# Patient Record
Sex: FEMALE | Race: BLACK OR AFRICAN AMERICAN | NOT HISPANIC OR LATINO | URBAN - METROPOLITAN AREA
[De-identification: names, ages, dates, MRNs, and addresses within clinical notes are randomized per-mention and may not be internally consistent; named-entity substitution may affect disease eponyms.]

---

## 2017-01-30 ENCOUNTER — FOLLOW UP ESTABLISHED (OUTPATIENT)
Dept: URBAN - METROPOLITAN AREA CLINIC 51 | Facility: CLINIC | Age: 72
End: 2017-01-30
Payer: MEDICARE

## 2017-01-30 DIAGNOSIS — H53.022 REFRACTIVE AMBLYOPIA, LEFT EYE: ICD-10-CM

## 2017-01-30 PROCEDURE — 92133 CPTRZD OPH DX IMG PST SGM ON: CPT | Performed by: OPHTHALMOLOGY

## 2017-01-30 PROCEDURE — 92012 INTRM OPH EXAM EST PATIENT: CPT | Performed by: OPHTHALMOLOGY

## 2017-01-30 RX ORDER — LATANOPROST 50 UG/ML
0.005 % SOLUTION OPHTHALMIC
Qty: 3 | Refills: 1 | Status: INACTIVE
Start: 2017-01-30 | End: 2018-03-26

## 2017-01-30 RX ORDER — DORZOLAMIDE HYDROCHLORIDE 20 MG/ML
2 % SOLUTION OPHTHALMIC
Qty: 3 | Refills: 1 | Status: INACTIVE
Start: 2017-01-30 | End: 2017-04-06

## 2017-01-30 RX ORDER — BRIMONIDINE TARTRATE 2 MG/ML
0.2 % SOLUTION/ DROPS OPHTHALMIC
Qty: 3 | Refills: 1 | Status: INACTIVE
Start: 2017-01-30 | End: 2017-04-10

## 2017-01-30 ASSESSMENT — INTRAOCULAR PRESSURE
OS: 14
OD: 14

## 2017-07-31 ENCOUNTER — FOLLOW UP ESTABLISHED (OUTPATIENT)
Dept: URBAN - METROPOLITAN AREA CLINIC 51 | Facility: CLINIC | Age: 72
End: 2017-07-31
Payer: MEDICARE

## 2017-07-31 DIAGNOSIS — H04.123 DRY EYE SYNDROME OF BILATERAL LACRIMAL GLANDS: ICD-10-CM

## 2017-07-31 DIAGNOSIS — E11.9 TYPE 2 DIABETES MELLITUS WITHOUT COMPLICATIONS: ICD-10-CM

## 2017-07-31 DIAGNOSIS — H40.1133 PRIMARY OPEN-ANGLE GLAUCOMA, BILATERAL, SEVERE STAGE: Primary | ICD-10-CM

## 2017-07-31 PROCEDURE — 92012 INTRM OPH EXAM EST PATIENT: CPT | Performed by: OPHTHALMOLOGY

## 2017-07-31 PROCEDURE — 92083 EXTENDED VISUAL FIELD XM: CPT | Performed by: OPHTHALMOLOGY

## 2017-07-31 ASSESSMENT — INTRAOCULAR PRESSURE
OS: 14
OD: 15

## 2017-11-20 ENCOUNTER — FOLLOW UP ESTABLISHED (OUTPATIENT)
Dept: URBAN - METROPOLITAN AREA CLINIC 51 | Facility: CLINIC | Age: 72
End: 2017-11-20
Payer: MEDICARE

## 2017-11-20 PROCEDURE — 92012 INTRM OPH EXAM EST PATIENT: CPT | Performed by: OPHTHALMOLOGY

## 2017-11-20 ASSESSMENT — INTRAOCULAR PRESSURE
OS: 18
OD: 16

## 2018-04-02 ENCOUNTER — FOLLOW UP ESTABLISHED (OUTPATIENT)
Dept: URBAN - METROPOLITAN AREA CLINIC 51 | Facility: CLINIC | Age: 73
End: 2018-04-02
Payer: MEDICARE

## 2018-04-02 PROCEDURE — 92012 INTRM OPH EXAM EST PATIENT: CPT | Performed by: OPTOMETRIST

## 2018-04-02 ASSESSMENT — INTRAOCULAR PRESSURE
OS: 18
OD: 22
OD: 18
OD: 17
OS: 22

## 2018-05-04 ENCOUNTER — FOLLOW UP ESTABLISHED (OUTPATIENT)
Dept: URBAN - METROPOLITAN AREA CLINIC 51 | Facility: CLINIC | Age: 73
End: 2018-05-04
Payer: MEDICARE

## 2018-05-04 PROCEDURE — 92133 CPTRZD OPH DX IMG PST SGM ON: CPT | Performed by: OPHTHALMOLOGY

## 2018-05-04 PROCEDURE — 92083 EXTENDED VISUAL FIELD XM: CPT | Performed by: OPHTHALMOLOGY

## 2018-05-04 PROCEDURE — 92014 COMPRE OPH EXAM EST PT 1/>: CPT | Performed by: OPHTHALMOLOGY

## 2018-05-04 PROCEDURE — 92020 GONIOSCOPY: CPT | Performed by: OPHTHALMOLOGY

## 2018-05-04 RX ORDER — PILOCARPINE HYDROCHLORIDE 20 MG/ML
2 % SOLUTION/ DROPS OPHTHALMIC
Qty: 3 | Refills: 1 | Status: INACTIVE
Start: 2018-05-04 | End: 2018-07-27

## 2018-05-04 ASSESSMENT — INTRAOCULAR PRESSURE
OS: 19
OD: 22

## 2018-05-04 ASSESSMENT — VISUAL ACUITY
OD: 20/15
OS: 20/30

## 2018-05-04 ASSESSMENT — KERATOMETRY
OS: 46.50
OD: 45.22

## 2018-06-22 ENCOUNTER — FOLLOW UP ESTABLISHED (OUTPATIENT)
Dept: URBAN - METROPOLITAN AREA CLINIC 51 | Facility: CLINIC | Age: 73
End: 2018-06-22
Payer: MEDICARE

## 2018-06-22 DIAGNOSIS — H40.1134 PRIMARY OPEN-ANGLE GLAUCOMA, BILATERAL, INDETERMINATE STAGE: Primary | ICD-10-CM

## 2018-06-22 DIAGNOSIS — Z79.84 LONG TERM (CURRENT) USE OF ORAL ANTIDIABETIC DRUGS: ICD-10-CM

## 2018-06-22 PROCEDURE — 92083 EXTENDED VISUAL FIELD XM: CPT | Performed by: OPHTHALMOLOGY

## 2018-06-22 PROCEDURE — 92014 COMPRE OPH EXAM EST PT 1/>: CPT | Performed by: OPHTHALMOLOGY

## 2018-06-22 RX ORDER — KETOROLAC TROMETHAMINE 5 MG/ML
0.5 % SOLUTION OPHTHALMIC
Qty: 1 | Refills: 1 | Status: INACTIVE
Start: 2018-06-22 | End: 2018-08-30

## 2018-06-22 ASSESSMENT — INTRAOCULAR PRESSURE
OD: 20
OS: 16

## 2018-07-09 ENCOUNTER — Encounter (OUTPATIENT)
Dept: URBAN - METROPOLITAN AREA CLINIC 44 | Facility: CLINIC | Age: 73
End: 2018-07-09
Payer: MEDICARE

## 2018-07-09 PROCEDURE — 65855 TRABECULOPLASTY LASER SURG: CPT | Performed by: OPHTHALMOLOGY

## 2018-07-09 ASSESSMENT — INTRAOCULAR PRESSURE
OD: 16
OS: 16
OS: 14
OD: 20

## 2018-07-17 ENCOUNTER — Encounter (OUTPATIENT)
Dept: URBAN - METROPOLITAN AREA CLINIC 44 | Facility: CLINIC | Age: 73
End: 2018-07-17
Payer: MEDICARE

## 2018-07-17 PROCEDURE — 65855 TRABECULOPLASTY LASER SURG: CPT | Performed by: OPHTHALMOLOGY

## 2018-07-17 ASSESSMENT — INTRAOCULAR PRESSURE
OD: 16
OS: 22
OS: 17

## 2018-07-27 ENCOUNTER — FOLLOW UP ESTABLISHED (OUTPATIENT)
Dept: URBAN - METROPOLITAN AREA CLINIC 51 | Facility: CLINIC | Age: 73
End: 2018-07-27
Payer: MEDICARE

## 2018-07-27 PROCEDURE — 92012 INTRM OPH EXAM EST PATIENT: CPT | Performed by: OPHTHALMOLOGY

## 2018-07-27 ASSESSMENT — INTRAOCULAR PRESSURE
OS: 17
OD: 20

## 2018-08-30 ENCOUNTER — FOLLOW UP ESTABLISHED (OUTPATIENT)
Dept: URBAN - METROPOLITAN AREA CLINIC 44 | Facility: CLINIC | Age: 73
End: 2018-08-30
Payer: MEDICARE

## 2018-08-30 PROCEDURE — 92012 INTRM OPH EXAM EST PATIENT: CPT | Performed by: OPHTHALMOLOGY

## 2018-08-30 RX ORDER — BRIMONIDINE TARTRATE 2 MG/ML
0.2 % SOLUTION/ DROPS OPHTHALMIC
Qty: 3 | Refills: 1 | Status: INACTIVE
Start: 2018-08-30 | End: 2019-04-29

## 2018-08-30 RX ORDER — LATANOPROST 50 UG/ML
0.005 % SOLUTION OPHTHALMIC
Qty: 3 | Refills: 1 | Status: INACTIVE
Start: 2018-08-30 | End: 2019-06-14

## 2018-08-30 ASSESSMENT — INTRAOCULAR PRESSURE
OD: 20
OS: 15

## 2018-12-28 ENCOUNTER — FOLLOW UP ESTABLISHED (OUTPATIENT)
Dept: URBAN - METROPOLITAN AREA CLINIC 51 | Facility: CLINIC | Age: 73
End: 2018-12-28
Payer: MEDICARE

## 2018-12-28 PROCEDURE — 92083 EXTENDED VISUAL FIELD XM: CPT | Performed by: OPHTHALMOLOGY

## 2018-12-28 PROCEDURE — 92012 INTRM OPH EXAM EST PATIENT: CPT | Performed by: OPHTHALMOLOGY

## 2018-12-28 RX ORDER — DORZOLAMIDE HCL 20 MG/ML
2 % SOLUTION/ DROPS OPHTHALMIC
Qty: 2 | Refills: 5 | Status: INACTIVE
Start: 2018-12-28 | End: 2019-08-09

## 2018-12-28 RX ORDER — NETARSUDIL 0.2 MG/ML
0.02 % SOLUTION/ DROPS OPHTHALMIC; TOPICAL
Qty: 1 | Refills: 5 | Status: INACTIVE
Start: 2018-12-28 | End: 2019-02-22

## 2018-12-28 ASSESSMENT — INTRAOCULAR PRESSURE
OS: 17
OD: 20

## 2019-02-22 ENCOUNTER — FOLLOW UP ESTABLISHED (OUTPATIENT)
Dept: URBAN - METROPOLITAN AREA CLINIC 51 | Facility: CLINIC | Age: 74
End: 2019-02-22
Payer: MEDICARE

## 2019-02-22 PROCEDURE — 92012 INTRM OPH EXAM EST PATIENT: CPT | Performed by: OPHTHALMOLOGY

## 2019-02-22 ASSESSMENT — INTRAOCULAR PRESSURE
OD: 21
OS: 17

## 2019-08-09 ENCOUNTER — FOLLOW UP ESTABLISHED (OUTPATIENT)
Dept: URBAN - METROPOLITAN AREA CLINIC 51 | Facility: CLINIC | Age: 74
End: 2019-08-09
Payer: MEDICARE

## 2019-08-09 PROCEDURE — 92250 FUNDUS PHOTOGRAPHY W/I&R: CPT | Performed by: OPHTHALMOLOGY

## 2019-08-09 PROCEDURE — 92014 COMPRE OPH EXAM EST PT 1/>: CPT | Performed by: OPHTHALMOLOGY

## 2019-08-09 PROCEDURE — 92083 EXTENDED VISUAL FIELD XM: CPT | Performed by: OPHTHALMOLOGY

## 2019-08-09 RX ORDER — LATANOPROST 50 UG/ML
0.005 % SOLUTION OPHTHALMIC
Qty: 1 | Refills: 11 | Status: INACTIVE
Start: 2019-08-09 | End: 2020-01-31

## 2019-08-09 RX ORDER — BRIMONIDINE TARTRATE 2 MG/ML
0.2 % SOLUTION/ DROPS OPHTHALMIC
Qty: 3 | Refills: 3 | Status: INACTIVE
Start: 2019-08-09 | End: 2020-01-31

## 2019-08-09 RX ORDER — DORZOLAMIDE HCL 20 MG/ML
2 % SOLUTION/ DROPS OPHTHALMIC
Qty: 1 | Refills: 11 | Status: INACTIVE
Start: 2019-08-09 | End: 2020-01-31

## 2019-08-09 ASSESSMENT — INTRAOCULAR PRESSURE
OS: 17
OD: 20

## 2020-01-31 ENCOUNTER — FOLLOW UP ESTABLISHED (OUTPATIENT)
Dept: URBAN - METROPOLITAN AREA CLINIC 51 | Facility: CLINIC | Age: 75
End: 2020-01-31
Payer: MEDICARE

## 2020-01-31 PROCEDURE — 92012 INTRM OPH EXAM EST PATIENT: CPT | Performed by: OPHTHALMOLOGY

## 2020-01-31 RX ORDER — DORZOLAMIDE HCL 20 MG/ML
2 % SOLUTION/ DROPS OPHTHALMIC
Qty: 1 | Refills: 5 | Status: INACTIVE
Start: 2020-01-31 | End: 2020-10-09

## 2020-01-31 RX ORDER — LATANOPROST 50 UG/ML
0.005 % SOLUTION OPHTHALMIC
Qty: 1 | Refills: 5 | Status: INACTIVE
Start: 2020-01-31 | End: 2020-10-09

## 2020-01-31 RX ORDER — BRIMONIDINE TARTRATE 2 MG/ML
0.2 % SOLUTION/ DROPS OPHTHALMIC
Qty: 1 | Refills: 5 | Status: INACTIVE
Start: 2020-01-31 | End: 2020-06-24

## 2020-01-31 ASSESSMENT — INTRAOCULAR PRESSURE
OS: 16
OD: 20

## 2020-07-13 ENCOUNTER — OFFICE VISIT (OUTPATIENT)
Dept: PRIMARY CARE CLINIC | Age: 75
End: 2020-07-13
Payer: MEDICARE

## 2020-07-13 VITALS
WEIGHT: 140.2 LBS | DIASTOLIC BLOOD PRESSURE: 58 MMHG | OXYGEN SATURATION: 99 % | TEMPERATURE: 98.1 F | HEART RATE: 91 BPM | HEIGHT: 64 IN | BODY MASS INDEX: 23.93 KG/M2 | SYSTOLIC BLOOD PRESSURE: 134 MMHG

## 2020-07-13 PROBLEM — I10 ESSENTIAL HYPERTENSION: Status: ACTIVE | Noted: 2020-07-13

## 2020-07-13 PROBLEM — E78.5 HYPERLIPIDEMIA: Status: ACTIVE | Noted: 2020-07-13

## 2020-07-13 PROBLEM — N18.30 CHRONIC RENAL INSUFFICIENCY, STAGE III (MODERATE) (HCC): Status: ACTIVE | Noted: 2020-07-13

## 2020-07-13 PROBLEM — E55.9 VITAMIN D DEFICIENCY: Status: ACTIVE | Noted: 2020-07-13

## 2020-07-13 PROCEDURE — 99202 OFFICE O/P NEW SF 15 MIN: CPT | Performed by: FAMILY MEDICINE

## 2020-07-13 RX ORDER — HYDROCHLOROTHIAZIDE 12.5 MG/1
12.5 CAPSULE, GELATIN COATED ORAL DAILY
COMMUNITY
End: 2020-10-29 | Stop reason: ALTCHOICE

## 2020-07-13 RX ORDER — AMLODIPINE BESYLATE 5 MG/1
5 TABLET ORAL DAILY
Qty: 90 TABLET | Refills: 1 | Status: SHIPPED | OUTPATIENT
Start: 2020-07-13 | End: 2020-08-27 | Stop reason: SDUPTHER

## 2020-07-13 RX ORDER — PRAVASTATIN SODIUM 80 MG/1
TABLET ORAL
COMMUNITY
End: 2020-09-02 | Stop reason: SDUPTHER

## 2020-07-13 RX ORDER — LOSARTAN POTASSIUM 100 MG/1
100 TABLET ORAL EVERY OTHER DAY
COMMUNITY
End: 2020-08-27 | Stop reason: SDUPTHER

## 2020-07-13 RX ORDER — FERROUS SULFATE 325(65) MG
325 TABLET ORAL DAILY
COMMUNITY
End: 2021-01-20 | Stop reason: ALTCHOICE

## 2020-07-13 RX ORDER — DORZOLAMIDE HCL 20 MG/ML
1 SOLUTION/ DROPS OPHTHALMIC 3 TIMES DAILY
COMMUNITY

## 2020-07-13 RX ORDER — METOPROLOL SUCCINATE 50 MG/1
50 TABLET, EXTENDED RELEASE ORAL DAILY
Qty: 90 TABLET | Refills: 1 | Status: SHIPPED | OUTPATIENT
Start: 2020-07-13 | End: 2021-02-08

## 2020-07-13 RX ORDER — METOPROLOL SUCCINATE 50 MG/1
50 TABLET, EXTENDED RELEASE ORAL
COMMUNITY
End: 2020-07-13 | Stop reason: SDUPTHER

## 2020-07-13 RX ORDER — BRIMONIDINE TARTRATE 2 MG/ML
1 SOLUTION/ DROPS OPHTHALMIC 3 TIMES DAILY
COMMUNITY
End: 2021-07-26 | Stop reason: SDUPTHER

## 2020-07-13 RX ORDER — AMLODIPINE BESYLATE 5 MG/1
5 TABLET ORAL DAILY
COMMUNITY
End: 2020-07-13 | Stop reason: SDUPTHER

## 2020-07-13 RX ORDER — GLIPIZIDE 2.5 MG/1
2.5 TABLET, EXTENDED RELEASE ORAL DAILY
COMMUNITY
End: 2020-11-03 | Stop reason: SDUPTHER

## 2020-07-13 RX ORDER — LATANOPROST 50 UG/ML
SOLUTION/ DROPS OPHTHALMIC
COMMUNITY

## 2020-07-13 SDOH — HEALTH STABILITY: MENTAL HEALTH: HOW OFTEN DO YOU HAVE A DRINK CONTAINING ALCOHOL?: NEVER

## 2020-07-13 ASSESSMENT — ENCOUNTER SYMPTOMS
NAUSEA: 0
ABDOMINAL PAIN: 0
SHORTNESS OF BREATH: 0
VOMITING: 0
DIARRHEA: 0

## 2020-07-13 ASSESSMENT — PATIENT HEALTH QUESTIONNAIRE - PHQ9
SUM OF ALL RESPONSES TO PHQ QUESTIONS 1-9: 0
1. LITTLE INTEREST OR PLEASURE IN DOING THINGS: 0
SUM OF ALL RESPONSES TO PHQ QUESTIONS 1-9: 0
SUM OF ALL RESPONSES TO PHQ9 QUESTIONS 1 & 2: 0
2. FEELING DOWN, DEPRESSED OR HOPELESS: 0

## 2020-07-13 NOTE — PROGRESS NOTES
daily Yes Historical Provider, MD        Past Medical History:   Diagnosis Date    Asthma     Chronic kidney disease     Glaucoma     Hyperlipidemia     Hypertension     Type 2 diabetes mellitus without complication (HCC)         Social History     Tobacco Use    Smoking status: Never Smoker    Smokeless tobacco: Never Used   Substance Use Topics    Alcohol use: Never     Frequency: Never    Drug use: Never        Past Surgical History:   Procedure Laterality Date    HYSTERECTOMY, TOTAL ABDOMINAL  2007        Allergies   Allergen Reactions    Lisinopril Other (See Comments)     cough        Family History   Problem Relation Age of Onset    High Blood Pressure Mother     Stroke Mother     High Blood Pressure Father     Heart Disease Father     High Blood Pressure Sister     Other Brother         Colon and GI cancer in multiple siblings (has 13 siblings)   Janett Barboza Diabetes Brother     High Blood Pressure Brother         Patient's past medical history, surgical history, family history, medications, and allergies  were all reviewed and updated as appropriate today. Review of Systems   Constitutional: Negative for chills, fever and unexpected weight change. Respiratory: Negative for shortness of breath. Cardiovascular: Negative for chest pain. Gastrointestinal: Negative for abdominal pain, diarrhea, nausea and vomiting. BP (!) 134/58   Pulse 91   Temp 98.1 °F (36.7 °C) (Oral)   Ht 5' 4\" (1.626 m)   Wt 140 lb 3.2 oz (63.6 kg)   SpO2 99% Comment: room air  BMI 24.07 kg/m²      Physical Exam  Vitals signs reviewed. Constitutional:       General: She is not in acute distress. Appearance: Normal appearance. She is well-developed. HENT:      Head: Normocephalic and atraumatic. Eyes:      General: No scleral icterus. Conjunctiva/sclera: Conjunctivae normal.      Pupils: Pupils are equal, round, and reactive to light. Neck:      Musculoskeletal: Neck supple.       Thyroid: No thyromegaly. Cardiovascular:      Rate and Rhythm: Normal rate and regular rhythm. Heart sounds: No murmur. Pulmonary:      Effort: Pulmonary effort is normal. No respiratory distress. Breath sounds: Normal breath sounds. Abdominal:      General: Bowel sounds are normal. There is no distension. Palpations: Abdomen is soft. Tenderness: There is no abdominal tenderness. Musculoskeletal:      Right lower leg: No edema. Left lower leg: No edema. Lymphadenopathy:      Cervical: No cervical adenopathy. Skin:     General: Skin is warm and dry. Capillary Refill: Capillary refill takes less than 2 seconds. Neurological:      General: No focal deficit present. Mental Status: She is alert and oriented to person, place, and time. Mental status is at baseline. Psychiatric:         Mood and Affect: Mood normal.         Assessment:  Encounter Diagnoses   Name Primary?  Essential hypertension Yes    Mild intermittent asthma without complication     Type 2 diabetes mellitus without complication, without long-term current use of insulin ([facility]        Plan:  1. Essential hypertension    - amLODIPine (NORVASC) 5 MG tablet; Take 1 tablet by mouth daily  Dispense: 90 tablet; Refill: 1  - metoprolol succinate (TOPROL XL) 50 MG extended release tablet; Take 1 tablet by mouth daily  Dispense: 90 tablet; Refill: 1  - CBC WITH AUTO DIFFERENTIAL; Future  - COMPREHENSIVE METABOLIC PANEL; Future  - LIPID PANEL; Future    2. Mild intermittent asthma without complication    - albuterol sulfate (PROAIR RESPICLICK) 119 (90 Base) MCG/ACT aerosol powder inhalation; Inhale 2 puffs into the lungs every 6 hours as needed for Wheezing  Dispense: 1 Inhaler; Refill: 5    3. Type 2 diabetes mellitus without complication, without long-term current use of insulin ([facility])    - HEMOGLOBIN A1C; Future  - MICROALBUMIN / CREATININE URINE RATIO; Future    Will check labs and call with results. UTD on colonoscopy. Will request all records from last PCP. Return in about 6 months (around 1/13/2021) for Follow up [facility].           [address], DO

## 2020-07-16 ENCOUNTER — TELEPHONE (OUTPATIENT)
Dept: PRIMARY CARE CLINIC | Age: 75
End: 2020-07-16

## 2020-07-16 NOTE — TELEPHONE ENCOUNTER
----- Message from Caren Omer sent at 7/16/2020  2:45 PM EDT -----  Subject: Message to Provider    QUESTIONS  Information for Provider? Pt is calling because she was prescribed   albuterol sulfate (PROAIR RESPICLICK) 422 (90 Base) MCG/ACT aerosol powder   inhalation    and pt states she has never use this kinds before. She wants to know if   she could get the regular one she's been using. Denise  ---------------------------------------------------------------------------  --------------  Tiffany MEDLEY  What is the best way for the office to contact you? Do not leave any   message   patient will call back for answer  Preferred Call Back Phone Number? [phone]  ---------------------------------------------------------------------------  --------------  SCRIPT ANSWERS  Relationship to Patient?  Self

## 2020-07-22 ENCOUNTER — TELEPHONE (OUTPATIENT)
Dept: PRIMARY CARE CLINIC | Age: 75
End: 2020-07-22

## 2020-07-22 RX ORDER — ALBUTEROL SULFATE 90 UG/1
2 AEROSOL, METERED RESPIRATORY (INHALATION) 4 TIMES DAILY PRN
Qty: 1 INHALER | Refills: 5 | Status: SHIPPED | OUTPATIENT
Start: 2020-07-22 | End: 2021-01-15 | Stop reason: SDUPTHER

## 2020-07-22 NOTE — TELEPHONE ENCOUNTER
Pt calling saying she received from pharm a ProAir Respiclick inhaler and has never used this before. She says she is used to albuteral [facility] inhaler and would like that back.   She says the powder is not working for her    Using [address]

## 2020-08-12 DIAGNOSIS — E11.9 TYPE 2 DIABETES MELLITUS WITHOUT COMPLICATION, WITHOUT LONG-TERM CURRENT USE OF INSULIN (HCC): ICD-10-CM

## 2020-08-12 DIAGNOSIS — I10 ESSENTIAL HYPERTENSION: ICD-10-CM

## 2020-08-12 LAB
A/G RATIO: 1.8 (ref 1.1–2.2)
ALBUMIN SERPL-MCNC: 4.5 G/DL (ref 3.4–5)
ALP BLD-CCNC: 82 U/L (ref 40–129)
ALT SERPL-CCNC: 16 U/L (ref 10–40)
ANION GAP SERPL CALCULATED.3IONS-SCNC: 13 MMOL/L (ref 3–16)
AST SERPL-CCNC: 24 U/L (ref 15–37)
BASOPHILS ABSOLUTE: 0.1 K/UL (ref 0–0.2)
BASOPHILS RELATIVE PERCENT: 0.8 %
BILIRUB SERPL-MCNC: 0.6 MG/DL (ref 0–1)
BUN BLDV-MCNC: 16 MG/DL (ref 7–20)
CALCIUM SERPL-MCNC: 10.2 MG/DL (ref 8.3–10.6)
CHLORIDE BLD-SCNC: 104 MMOL/L (ref 99–110)
CHOLESTEROL, TOTAL: 188 MG/DL (ref 0–199)
CO2: 24 MMOL/L (ref 21–32)
CREAT SERPL-MCNC: 1.6 MG/DL (ref 0.6–1.2)
CREATININE URINE: 244.5 MG/DL (ref 28–259)
EOSINOPHILS ABSOLUTE: 0.4 K/UL (ref 0–0.6)
EOSINOPHILS RELATIVE PERCENT: 3.6 %
GFR AFRICAN AMERICAN: 38
GFR NON-AFRICAN AMERICAN: 32
GLOBULIN: 2.5 G/DL
GLUCOSE BLD-MCNC: 152 MG/DL (ref 70–99)
HCT VFR BLD CALC: 35.9 % (ref 36–48)
HDLC SERPL-MCNC: 74 MG/DL (ref 40–60)
HEMOGLOBIN: 11.2 G/DL (ref 12–16)
LDL CHOLESTEROL CALCULATED: 100 MG/DL
LYMPHOCYTES ABSOLUTE: 3.8 K/UL (ref 1–5.1)
LYMPHOCYTES RELATIVE PERCENT: 33 %
MCH RBC QN AUTO: 26.8 PG (ref 26–34)
MCHC RBC AUTO-ENTMCNC: 31.3 G/DL (ref 31–36)
MCV RBC AUTO: 85.6 FL (ref 80–100)
MICROALBUMIN UR-MCNC: <1.2 MG/DL
MICROALBUMIN/CREAT UR-RTO: NORMAL MG/G (ref 0–30)
MONOCYTES ABSOLUTE: 0.9 K/UL (ref 0–1.3)
MONOCYTES RELATIVE PERCENT: 7.6 %
NEUTROPHILS ABSOLUTE: 6.3 K/UL (ref 1.7–7.7)
NEUTROPHILS RELATIVE PERCENT: 55 %
PDW BLD-RTO: 13.8 % (ref 12.4–15.4)
PLATELET # BLD: 378 K/UL (ref 135–450)
PMV BLD AUTO: 7.8 FL (ref 5–10.5)
POTASSIUM SERPL-SCNC: 4.6 MMOL/L (ref 3.5–5.1)
RBC # BLD: 4.19 M/UL (ref 4–5.2)
SODIUM BLD-SCNC: 141 MMOL/L (ref 136–145)
TOTAL PROTEIN: 7 G/DL (ref 6.4–8.2)
TRIGL SERPL-MCNC: 71 MG/DL (ref 0–150)
VLDLC SERPL CALC-MCNC: 14 MG/DL
WBC # BLD: 11.4 K/UL (ref 4–11)

## 2020-08-13 ENCOUNTER — TELEPHONE (OUTPATIENT)
Dept: PRIMARY CARE CLINIC | Age: 75
End: 2020-08-13

## 2020-08-13 LAB
ESTIMATED AVERAGE GLUCOSE: 159.9 MG/DL
HBA1C MFR BLD: 7.2 %

## 2020-08-13 NOTE — TELEPHONE ENCOUNTER
Notified patient. Labs stable. Will establish with nephrology. Referral to Dr. Adry Berrios and message sent. She verbalized understanding.

## 2020-08-25 ENCOUNTER — HOSPITAL ENCOUNTER (EMERGENCY)
Age: 75
Discharge: HOME | End: 2020-08-25
Attending: EMERGENCY MEDICINE
Payer: MEDICARE

## 2020-08-25 ENCOUNTER — APPOINTMENT (OUTPATIENT)
Dept: GENERAL RADIOLOGY | Age: 75
End: 2020-08-25
Payer: MEDICARE

## 2020-08-25 VITALS
OXYGEN SATURATION: 94 % | BODY MASS INDEX: 23.32 KG/M2 | DIASTOLIC BLOOD PRESSURE: 62 MMHG | TEMPERATURE: 98.7 F | HEIGHT: 65 IN | HEART RATE: 89 BPM | RESPIRATION RATE: 18 BRPM | SYSTOLIC BLOOD PRESSURE: 140 MMHG | WEIGHT: 140 LBS

## 2020-08-25 LAB
ANION GAP SERPL CALCULATED.3IONS-SCNC: 12 MMOL/L (ref 3–16)
BASE EXCESS VENOUS: -1.7 MMOL/L (ref -2–3)
BASOPHILS ABSOLUTE: 0.1 K/UL (ref 0–0.2)
BASOPHILS RELATIVE PERCENT: 1 %
BUN BLDV-MCNC: 20 MG/DL (ref 7–20)
CALCIUM SERPL-MCNC: 9.5 MG/DL (ref 8.3–10.6)
CARBOXYHEMOGLOBIN: 1.7 % (ref 0–1.5)
CHLORIDE BLD-SCNC: 101 MMOL/L (ref 99–110)
CO2: 23 MMOL/L (ref 21–32)
CREAT SERPL-MCNC: 1.6 MG/DL (ref 0.6–1.2)
EKG ATRIAL RATE: 99 BPM
EKG DIAGNOSIS: NORMAL
EKG P AXIS: 82 DEGREES
EKG P-R INTERVAL: 138 MS
EKG Q-T INTERVAL: 348 MS
EKG QRS DURATION: 78 MS
EKG QTC CALCULATION (BAZETT): 446 MS
EKG R AXIS: 12 DEGREES
EKG T AXIS: 72 DEGREES
EKG VENTRICULAR RATE: 99 BPM
EOSINOPHILS ABSOLUTE: 0.1 K/UL (ref 0–0.6)
EOSINOPHILS RELATIVE PERCENT: 1 %
GFR AFRICAN AMERICAN: 38
GFR NON-AFRICAN AMERICAN: 32
GLUCOSE BLD-MCNC: 179 MG/DL (ref 70–99)
HCO3 VENOUS: 23.5 MMOL/L (ref 24–28)
HCT VFR BLD CALC: 32.7 % (ref 36–48)
HEMOGLOBIN, VEN, REDUCED: 23.9 %
HEMOGLOBIN: 10.6 G/DL (ref 12–16)
LYMPHOCYTES ABSOLUTE: 5.6 K/UL (ref 1–5.1)
LYMPHOCYTES RELATIVE PERCENT: 39 %
MCH RBC QN AUTO: 27.3 PG (ref 26–34)
MCHC RBC AUTO-ENTMCNC: 32.5 G/DL (ref 31–36)
MCV RBC AUTO: 84.1 FL (ref 80–100)
METHEMOGLOBIN VENOUS: 0.6 % (ref 0–1.5)
MONOCYTES ABSOLUTE: 1.6 K/UL (ref 0–1.3)
MONOCYTES RELATIVE PERCENT: 11 %
NEUTROPHILS ABSOLUTE: 6.9 K/UL (ref 1.7–7.7)
NEUTROPHILS RELATIVE PERCENT: 48 %
O2 SAT, VEN: 76 %
PCO2, VEN: 44.5 MMHG (ref 41–51)
PDW BLD-RTO: 13.6 % (ref 12.4–15.4)
PH VENOUS: 7.34 (ref 7.35–7.45)
PLATELET # BLD: 349 K/UL (ref 135–450)
PMV BLD AUTO: 7.8 FL (ref 5–10.5)
PO2, VEN: 46 MMHG (ref 25–40)
POTASSIUM SERPL-SCNC: 3.7 MMOL/L (ref 3.5–5.1)
RBC # BLD: 3.89 M/UL (ref 4–5.2)
SODIUM BLD-SCNC: 136 MMOL/L (ref 136–145)
TCO2 CALC VENOUS: 25 MMOL/L
TROPONIN: <0.01 NG/ML
WBC # BLD: 14.3 K/UL (ref 4–11)

## 2020-08-25 PROCEDURE — 71045 X-RAY EXAM CHEST 1 VIEW: CPT

## 2020-08-25 PROCEDURE — 6360000002 HC RX W HCPCS: Performed by: EMERGENCY MEDICINE

## 2020-08-25 PROCEDURE — 99285 EMERGENCY DEPT VISIT HI MDM: CPT

## 2020-08-25 PROCEDURE — U0003 INFECTIOUS AGENT DETECTION BY NUCLEIC ACID (DNA OR RNA); SEVERE ACUTE RESPIRATORY SYNDROME CORONAVIRUS 2 (SARS-COV-2) (CORONAVIRUS DISEASE [COVID-19]), AMPLIFIED PROBE TECHNIQUE, MAKING USE OF HIGH THROUGHPUT TECHNOLOGIES AS DESCRIBED BY CMS-2020-01-R: HCPCS

## 2020-08-25 PROCEDURE — 80048 BASIC METABOLIC PNL TOTAL CA: CPT

## 2020-08-25 PROCEDURE — 6370000000 HC RX 637 (ALT 250 FOR IP): Performed by: EMERGENCY MEDICINE

## 2020-08-25 PROCEDURE — 94640 AIRWAY INHALATION TREATMENT: CPT

## 2020-08-25 PROCEDURE — 85025 COMPLETE CBC W/AUTO DIFF WBC: CPT

## 2020-08-25 PROCEDURE — 93005 ELECTROCARDIOGRAM TRACING: CPT | Performed by: EMERGENCY MEDICINE

## 2020-08-25 PROCEDURE — 36415 COLL VENOUS BLD VENIPUNCTURE: CPT

## 2020-08-25 PROCEDURE — 84484 ASSAY OF TROPONIN QUANT: CPT

## 2020-08-25 PROCEDURE — 82803 BLOOD GASES ANY COMBINATION: CPT

## 2020-08-25 RX ORDER — ALBUTEROL SULFATE 2.5 MG/3ML
2.5 SOLUTION RESPIRATORY (INHALATION)
Status: DISCONTINUED | OUTPATIENT
Start: 2020-08-25 | End: 2020-08-25 | Stop reason: HOSPADM

## 2020-08-25 RX ORDER — PREDNISONE 10 MG/1
TABLET ORAL
Qty: 20 TABLET | Refills: 0 | Status: SHIPPED | OUTPATIENT
Start: 2020-08-25 | End: 2020-09-02 | Stop reason: ALTCHOICE

## 2020-08-25 RX ORDER — IPRATROPIUM BROMIDE AND ALBUTEROL SULFATE 2.5; .5 MG/3ML; MG/3ML
1 SOLUTION RESPIRATORY (INHALATION) ONCE
Status: COMPLETED | OUTPATIENT
Start: 2020-08-25 | End: 2020-08-25

## 2020-08-25 RX ORDER — PREDNISONE 20 MG/1
40 TABLET ORAL ONCE
Status: DISCONTINUED | OUTPATIENT
Start: 2020-08-25 | End: 2020-08-25 | Stop reason: HOSPADM

## 2020-08-25 RX ADMIN — ALBUTEROL SULFATE 2.5 MG: 2.5 SOLUTION RESPIRATORY (INHALATION) at 16:07

## 2020-08-25 RX ADMIN — IPRATROPIUM BROMIDE AND ALBUTEROL SULFATE 1 AMPULE: .5; 3 SOLUTION RESPIRATORY (INHALATION) at 16:07

## 2020-08-25 ASSESSMENT — PAIN DESCRIPTION - LOCATION: LOCATION: CHEST

## 2020-08-25 ASSESSMENT — PAIN DESCRIPTION - FREQUENCY: FREQUENCY: CONTINUOUS

## 2020-08-25 ASSESSMENT — PAIN DESCRIPTION - DESCRIPTORS: DESCRIPTORS: TIGHTNESS

## 2020-08-25 ASSESSMENT — PAIN SCALES - GENERAL: PAINLEVEL_OUTOF10: 8

## 2020-08-25 ASSESSMENT — PAIN DESCRIPTION - PAIN TYPE: TYPE: ACUTE PAIN

## 2020-08-25 NOTE — ED NOTES
Ambulated patient to restroom w/ pulse ox, O2 sats remained above 94% during ambulation.       Lonita Kussmaul, RN  08/25/20 5655

## 2020-08-25 NOTE — ED PROVIDER NOTES
[facility] [address] [facility] ENCOUNTER          ATTENDING PHYSICIAN NOTE       Date of evaluation: 8/25/2020    Chief Complaint     Asthma (asthma exacerbation started around 2 pm while at rest)      History of Present Illness     Dia Tong is a 70 y.o. female who presents to the emergency department with a complaint of difficulty breathing. Patient reports that her symptoms are similar to exacerbations of asthma that she has had in the past.  The patient reports that she awoke this morning and had significant difficulty breathing denies cough or productive cough. Reports no recent fevers or chills or sick contacts. Denies significant chest pain but does have some tightness across her chest associated with her difficulty breathing. Denies exertional component to this. Denies any other abdominal pain no other associated symptoms. Review of Systems     As documented in the HPI, otherwise all other systems were reviewed and were negative. Past Medical, Surgical, Family, and Social History     She has a past medical history of Asthma, Chronic kidney disease, Glaucoma, Hyperlipidemia, Hypertension, and Type 2 diabetes mellitus without complication ([facility] She has a past surgical history that includes Hysterectomy, total abdominal (2007). Her family history includes Diabetes in her brother; Heart Disease in her father; High Blood Pressure in her brother, father, mother, and sister; Other in her brother; Stroke in her mother. She reports that she has never smoked. She has never used smokeless tobacco. She reports that she does not drink alcohol or use drugs.     Medications     Previous Medications    ALBUTEROL SULFATE HFA (VENTOLIN HFA) 108 (90 BASE) MCG/ACT INHALER    Inhale 2 puffs into the lungs 4 times daily as needed for Wheezing    AMLODIPINE (NORVASC) 5 MG TABLET    Take 1 tablet by mouth daily    BRIMONIDINE (ALPHAGAN) 0.2 % OPHTHALMIC SOLUTION    1 drop 3 times daily    DORZOLAMIDE (TRUSOPT) 2 % OPHTHALMIC SOLUTION    Place 1 drop into both eyes 3 times daily    FERROUS SULFATE (IRON 325) 325 (65 FE) MG TABLET    Take 325 mg by mouth daily    GLIPIZIDE (GLUCOTROL XL) 2.5 MG EXTENDED RELEASE TABLET    Take 2.5 mg by mouth daily    HYDROCHLOROTHIAZIDE (MICROZIDE) 12.5 MG CAPSULE    Take 12.5 mg by mouth daily    LATANOPROST (XALATAN) 0.005 % OPHTHALMIC SOLUTION    latanoprost 0.005 % eye drops   INSTILL 1 DROP INTO EACH EYE EVERY DAY AT BEDTIME    LOSARTAN (COZAAR) 100 MG TABLET    Take 100 mg by mouth daily    METOPROLOL SUCCINATE (TOPROL XL) 50 MG EXTENDED RELEASE TABLET    Take 1 tablet by mouth daily    PRAVASTATIN (PRAVACHOL) 80 MG TABLET    pravastatin sodium 80 mg tabs    VITAMIN D 25 MCG (1000 UT) CAPS    Take by mouth       Allergies     She is allergic to lisinopril.     Physical Exam     INITIAL VITALS: BP: (!) 142/71, Temp: 98.7 °F (37.1 °C), Pulse: 100, Resp: 18, SpO2: 97 %   General: 82-year-old female sitting in bed without marketed respiratory distress  HEENT:  head is atraumatic, pupils equal round and reactive to light, sclera are clear, oropharynx is nonerythematous  Neck: supple, no lymphadenopathy  Chest: Very mild tachypnea, no accessory muscle use, diminished breath sounds heard throughout all lung fields with wheezes heard throughout all lung fields  Cardiovascular: Regular, rate, and rhythm, 2+ radial pulses bilaterally, capillary refill 2 seconds  Abdominal: Soft, nontender, nondistended, positive bowel sounds throughout, no rebound or guarding  Skin: Warm, dry well perfused, no rashes  Musculoskeletal: no obvious deformities, no tenderness to palpation diffusely  Neurologic:  alert and oriented x4, speech is clear and intact without dysarthria, gait is intact    Diagnostic Results     EKG   Normal sinus rhythm poor anterior R wave progression T wave flattening in the lateral leads otherwise no significant ST or T wave changes that be indicative of active ischemia normal axis normal intervals    RADIOLOGY:  XR CHEST PORTABLE   Final Result      No acute disease             LABS:   Results for orders placed or performed during the hospital encounter of 08/25/20   CBC Auto Differential   Result Value Ref Range    WBC 14.3 (H) 4.0 - 11.0 K/uL    RBC 3.89 (L) 4.00 - 5.20 M/uL    Hemoglobin 10.6 (L) 12.0 - 16.0 g/dL    Hematocrit 32.7 (L) 36.0 - 48.0 %    MCV 84.1 80.0 - 100.0 fL    MCH 27.3 26.0 - 34.0 pg    MCHC 32.5 31.0 - 36.0 g/dL    RDW 13.6 12.4 - 15.4 %    Platelets 882 208 - 500 K/uL    MPV 7.8 5.0 - 10.5 fL   Basic Metabolic Panel   Result Value Ref Range    Sodium 136 136 - 145 mmol/L    Potassium 3.7 3.5 - 5.1 mmol/L    Chloride 101 99 - 110 mmol/L    CO2 23 21 - 32 mmol/L    Anion Gap 12 3 - 16    Glucose 179 (H) 70 - 99 mg/dL    BUN 20 7 - 20 mg/dL    CREATININE 1.6 (H) 0.6 - 1.2 mg/dL    GFR Non- 32 (A) >60    GFR  38 (A) >60    Calcium 9.5 8.3 - 10.6 mg/dL   Blood Gas, Venous   Result Value Ref Range    pH, Rinku 7.343 (L) 7.350 - 7.450    pCO2, Rinku 44.5 41.0 - 51.0 mmHg    pO2, Rinku 46.0 (H) 25.0 - 40.0 mmHg    HCO3, Venous 23.5 (L) 24.0 - 28.0 mmol/L    Base Excess, Rinku -1.7 -2.0 - 3.0 mmol/L    O2 Sat, Rinku 76 Not established %    Carboxyhemoglobin 1.7 (H) 0.0 - 1.5 %    MetHgb, Rinku 0.6 0.0 - 1.5 %    TC02 (Calc), Rinku 25 mmol/L    Hemoglobin, Rinku, Reduced 23.90 %   Troponin   Result Value Ref Range    Troponin <0.01 <0.01 ng/mL         RECENT VITALS:  BP: (!) 140/62, Temp: 98.7 °F (37.1 °C), Pulse: 100, Resp: 18, SpO2: 94 %       ED Course     Nursing Notes, Past Medical Hx, Past Surgical Hx, Social Hx, Allergies, and Family Hx were reviewed.     The patient was given the following medications:  Orders Placed This Encounter   Medications    predniSONE (DELTASONE) tablet 40 mg    ipratropium-albuterol (DUONEB) nebulizer solution 1 ampule    albuterol (PROVENTIL) nebulizer solution 2.5 mg    predniSONE (DELTASONE) 10 MG tablet     Sig: Take [address]  [phone]            DISCHARGE MEDICATIONS:  New Prescriptions    PREDNISONE (DELTASONE) 10 MG TABLET    Take 4 tablets by mouth once daily for 5 days       Yang Floyd MD  08/25/20 0358

## 2020-08-26 ENCOUNTER — CARE COORDINATION (OUTPATIENT)
Dept: CARE COORDINATION | Age: 75
End: 2020-08-26

## 2020-08-26 NOTE — CARE COORDINATION
First phone call placed to reach patient for ED FU and she was not available. Plan  Make second phone call    Henderson Pool.  Pedro Tabares RN, BSN, [address] [place] [address] Primary Care  [phone]

## 2020-08-27 ENCOUNTER — CARE COORDINATION (OUTPATIENT)
Dept: CARE COORDINATION | Age: 75
End: 2020-08-27

## 2020-08-27 NOTE — CARE COORDINATION
Patient contacted regarding Pearl Castro. Discussed COVID-19 related testing which was pending at this time. Test results were pending. Patient informed of results, if available? pending    Care Transition Nurse/ Ambulatory Care Manager contacted the patient by telephone to perform post discharge assessment. Verified name and  with patient as identifiers. Provided introduction to self, and explanation of the CTN/ACM role, and reason for call due to risk factors for infection and/or exposure to COVID-19. Symptoms reviewed with patient who verbalized the following symptoms: shortness of breath. Due to no new or worsening symptoms encounter was not routed to provider for escalation. Discussed follow-up appointments. If no appointment was previously scheduled, appointment scheduling offered: Yes  [address] follow up appointment(s):   Future Appointments   Date Time Provider Avi Wells   2020 11:45 AM Shahnaz Bills MD [facility]   2021 10:00 AM DO SARA Zayas [facility]     Non-[facility] follow up appointment(s): none     Advance Care Planning:   Does patient have an Advance Directive:  reviewed and current. Patient has following risk factors of: asthma, diabetes, chronic kidney disease and glaucoma, HLD and HTN. CTN/ACM reviewed discharge instructions, medical action plan and red flags such as increased shortness of breath, increasing fever and signs of decompensation with patient who verbalized understanding. Discussed exposure protocols and quarantine with CDC Guidelines What to do if you are sick with coronavirus disease .  Patient was given an opportunity for questions and concerns. The patient agrees to contact the [facility] exposure line [phone], local [facility] department [place] Department of Health: ([phone]) and PCP office for questions related to their healthcare. CTN/ACM provided contact information for future needs.     Reviewed and educated patient on any new and changed medications related to discharge diagnosis     Patient/family/caregiver given information for GetWell Loop and agrees to enroll yes  Patient's preferred e-mail: [email or web address]   Patient's preferred phone number: [phone]  Based on Loop alert triggers, patient will be contacted by nurse care manager for worsening symptoms. Pt will be further monitored by COVID Loop Team based on severity of symptoms and risk factors. Patient states her SOB is better today. She has been using her nebulizer. She will begin her prednisone today. States she did not have ride until today to get her medication. She has appointment with Dr. Isaiah Sands today. Offered GETWELL LOOP and she accepted. Plan  Enroll into [facility] RN, BSN, [address] [place] [address] Primary Care  [phone]

## 2020-08-31 LAB — SARS-COV-2, NAA: NOT DETECTED

## 2020-09-01 ENCOUNTER — TELEPHONE (OUTPATIENT)
Dept: PRIMARY CARE CLINIC | Age: 75
End: 2020-09-01

## 2020-09-01 NOTE — TELEPHONE ENCOUNTER
----- Message from [address] sent at 8/31/2020  5:12 PM EDT -----  Subject: Message to Provider    QUESTIONS  Information for Provider? Did not receive a call for her Telephone   Appointment  ---------------------------------------------------------------------------  --------------  [address]  What is the best way for the office to contact you? OK to leave message on   voicemail  Preferred Call Back Phone Number? [phone]  ---------------------------------------------------------------------------  --------------  SCRIPT ANSWERS  Relationship to Patient?  Self

## 2020-09-01 NOTE — TELEPHONE ENCOUNTER
I called and spoke with pt.   Told her central scheduling had inappropriately scheduled her in a non-available time slot    She is now scheduled for an in-person visit tomorrow, 9-2, at 1pm

## 2020-09-02 ENCOUNTER — OFFICE VISIT (OUTPATIENT)
Dept: PRIMARY CARE CLINIC | Age: 75
End: 2020-09-02
Payer: MEDICARE

## 2020-09-02 VITALS
WEIGHT: 140.8 LBS | HEIGHT: 64 IN | BODY MASS INDEX: 24.04 KG/M2 | HEART RATE: 96 BPM | SYSTOLIC BLOOD PRESSURE: 146 MMHG | TEMPERATURE: 98.2 F | DIASTOLIC BLOOD PRESSURE: 52 MMHG | OXYGEN SATURATION: 100 %

## 2020-09-02 PROCEDURE — 99213 OFFICE O/P EST LOW 20 MIN: CPT | Performed by: FAMILY MEDICINE

## 2020-09-02 PROCEDURE — 3051F HG A1C>EQUAL 7.0%<8.0%: CPT | Performed by: FAMILY MEDICINE

## 2020-09-02 RX ORDER — PRAVASTATIN SODIUM 80 MG/1
TABLET ORAL
Qty: 90 TABLET | Refills: 1 | Status: SHIPPED | OUTPATIENT
Start: 2020-09-02 | End: 2021-04-08

## 2020-09-02 RX ORDER — GLUCOSAMINE HCL/CHONDROITIN SU 500-400 MG
CAPSULE ORAL
Qty: 100 STRIP | Refills: 1 | Status: SHIPPED | OUTPATIENT
Start: 2020-09-02 | End: 2020-12-04 | Stop reason: SDUPTHER

## 2020-09-02 RX ORDER — DILTIAZEM HYDROCHLORIDE 60 MG/1
2 TABLET, FILM COATED ORAL 2 TIMES DAILY
Qty: 1 INHALER | Refills: 3 | Status: SHIPPED | OUTPATIENT
Start: 2020-09-02 | End: 2021-07-26 | Stop reason: ALTCHOICE

## 2020-09-02 ASSESSMENT — ENCOUNTER SYMPTOMS
COUGH: 0
CHEST TIGHTNESS: 1
SHORTNESS OF BREATH: 0
DIARRHEA: 0
VOMITING: 0
NAUSEA: 0
ABDOMINAL PAIN: 0

## 2020-09-02 NOTE — PROGRESS NOTES
[address] [facility] [address] PRIMARY CARE  [address] [place] [id number]  Dept: [phone]  Dept Fax: [phone]     9/2/2020      Ivy Barragan   1945     Chief Complaint   Patient presents with    ED Follow-up     asthma, [facility]       HPI  Pt comes in today for asthma, ER follow up. Had an asthma attack on 8/25/20. Typically 1 exacerbation per year. She used albuterol nebulizer that didn't help so went to ER. Discharged on prednisone. Finished yesterday. She is feeling mostly back to baseline, but still having some chest tightness. Recently moved from [place]. Feels the humidity is affecting her. Is using her albuterol 4x/day. Feels jittery as a result. Has not been on a controller inhaler in many years. Recently established with Nephrology. Meds adjusted for blood pressure. Losartan is now daily and amlodipine down to 2.5 mg 2/2 swelling. Still on the metoprolol 50mg. Prior to Visit Medications    Medication Sig Taking?  Authorizing Provider   losartan (COZAAR) 100 MG tablet Take 1 tablet by mouth daily Yes Lian Gregory MD   amLODIPine (NORVASC) 2.5 MG tablet Take 1 tablet by mouth daily Yes Lian Gregory MD   albuterol sulfate HFA (VENTOLIN HFA) 108 (90 Base) MCG/ACT inhaler Inhale 2 puffs into the lungs 4 times daily as needed for Wheezing Yes Marjorie Bond,    ferrous sulfate (IRON 325) 325 (65 Fe) MG tablet Take 325 mg by mouth daily Yes Historical Provider, MD   dorzolamide (TRUSOPT) 2 % ophthalmic solution Place 1 drop into both eyes 3 times daily Yes Historical Provider, MD   pravastatin (PRAVACHOL) 80 MG tablet pravastatin sodium 80 mg tabs Yes Historical Provider, MD   latanoprost (XALATAN) 0.005 % ophthalmic solution latanoprost 0.005 % eye drops   INSTILL 1 DROP INTO EACH EYE EVERY DAY AT BEDTIME Yes Historical Provider, MD   brimonidine (ALPHAGAN) 0.2 % ophthalmic solution 1 drop 3 times daily Yes Historical Provider, MD glipiZIDE (GLUCOTROL XL) 2.5 MG extended release tablet Take 2.5 mg by mouth daily Yes Historical Provider, MD   hydroCHLOROthiazide (MICROZIDE) 12.5 MG capsule Take 12.5 mg by mouth daily Yes Historical Provider, MD   vitamin D 25 MCG (1000 UT) CAPS Take by mouth Yes Historical Provider, MD   metoprolol succinate (TOPROL XL) 50 MG extended release tablet Take 1 tablet by mouth daily Yes [address], DO        Past Medical History:   Diagnosis Date    Asthma     Chronic kidney disease     Glaucoma     Hyperlipidemia     Hypertension     Type 2 diabetes mellitus without complication ([facility]         Social History     Tobacco Use    Smoking status: Never Smoker    Smokeless tobacco: Never Used   Substance Use Topics    Alcohol use: Never     Frequency: Never    Drug use: Never        Past Surgical History:   Procedure Laterality Date    HYSTERECTOMY, TOTAL ABDOMINAL  2007        Allergies   Allergen Reactions    Lisinopril Other (See Comments)     cough        Family History   Problem Relation Age of Onset    High Blood Pressure Mother     Stroke Mother     High Blood Pressure Father     Heart Disease Father     High Blood Pressure Sister     Other Brother         Colon and GI cancer in multiple siblings (has 13 siblings)    Diabetes Brother     High Blood Pressure Brother         Patient's past medical history, surgical history, family history, medications, and allergies  were all reviewed and updated as appropriate today. Review of Systems   Constitutional: Negative for chills, fever and unexpected weight change. Respiratory: Positive for chest tightness. Negative for cough and shortness of breath. Cardiovascular: Negative for chest pain. Gastrointestinal: Negative for abdominal pain, diarrhea, nausea and vomiting.        BP (!) 146/52   Pulse 96   Temp 98.2 °F (36.8 °C) (Oral)   Ht 5' 4\" (1.626 m)   Wt 140 lb 12.8 oz (63.9 kg)   SpO2 100% Comment: room ai

## 2020-09-04 ENCOUNTER — TELEPHONE (OUTPATIENT)
Dept: ORTHOPEDIC SURGERY | Age: 75
End: 2020-09-04

## 2020-09-08 ENCOUNTER — TELEPHONE (OUTPATIENT)
Dept: PRIMARY CARE CLINIC | Age: 75
End: 2020-09-08

## 2020-09-08 NOTE — TELEPHONE ENCOUNTER
PT CALLING SAYING HER PHARM HAS HER ORDER FOR SYMBICORT ON HOLD BECAUSE THEY RAN IT THRU INS AND SHE WOULD STILL HAVE TO PAY $139 FOR ONE INHALER    SHE SAYS THAT IS TOO EXPENSIVE AND SHE CANNOT AFFORD THAT.   SHE IS WONDERING IF THERE IS AN ALTERNATIVE

## 2020-10-20 DIAGNOSIS — N18.30 CKD (CHRONIC KIDNEY DISEASE), STAGE III (HCC): ICD-10-CM

## 2020-10-20 LAB
ALBUMIN SERPL-MCNC: 4.6 G/DL (ref 3.4–5)
ANION GAP SERPL CALCULATED.3IONS-SCNC: 12 MMOL/L (ref 3–16)
BUN BLDV-MCNC: 23 MG/DL (ref 7–20)
CALCIUM SERPL-MCNC: 10.2 MG/DL (ref 8.3–10.6)
CHLORIDE BLD-SCNC: 98 MMOL/L (ref 99–110)
CO2: 23 MMOL/L (ref 21–32)
CREAT SERPL-MCNC: 1.7 MG/DL (ref 0.6–1.2)
CREATININE URINE: 238.9 MG/DL (ref 28–259)
GFR AFRICAN AMERICAN: 36
GFR NON-AFRICAN AMERICAN: 29
GLUCOSE BLD-MCNC: 174 MG/DL (ref 70–99)
MICROALBUMIN UR-MCNC: <1.2 MG/DL
MICROALBUMIN/CREAT UR-RTO: NORMAL MG/G (ref 0–30)
PHOSPHORUS: 3.3 MG/DL (ref 2.5–4.9)
POTASSIUM SERPL-SCNC: 5.3 MMOL/L (ref 3.5–5.1)
SODIUM BLD-SCNC: 133 MMOL/L (ref 136–145)

## 2020-11-04 RX ORDER — GLIPIZIDE 2.5 MG/1
2.5 TABLET, EXTENDED RELEASE ORAL DAILY
Qty: 90 TABLET | Refills: 1 | Status: SHIPPED | OUTPATIENT
Start: 2020-11-04 | End: 2021-05-06

## 2020-12-04 ENCOUNTER — TELEPHONE (OUTPATIENT)
Dept: ORTHOPEDIC SURGERY | Age: 75
End: 2020-12-04

## 2020-12-04 RX ORDER — LANCETS 30 GAUGE
1 EACH MISCELLANEOUS DAILY
Qty: 200 EACH | Refills: 3 | Status: SHIPPED | OUTPATIENT
Start: 2020-12-04 | End: 2022-03-28

## 2020-12-04 RX ORDER — GLUCOSAMINE HCL/CHONDROITIN SU 500-400 MG
CAPSULE ORAL
Qty: 200 STRIP | Refills: 3 | Status: SHIPPED | OUTPATIENT
Start: 2020-12-04 | End: 2021-03-17 | Stop reason: SDUPTHER

## 2020-12-04 NOTE — TELEPHONE ENCOUNTER
PA submitted via [employer] for OneTouch Ultra strips. Key[employer]andr[employer] Jurist - Rx #: [id number]    Message from [facility]: Drug is not covered by plan. Called [facility] and spoke to [place]. who stated that the test strips are not covered under Medicare Part D. The pharmacy needs to bill Medicare Part B. Please advise pharmacy. Thank you.

## 2020-12-07 ENCOUNTER — TELEPHONE (OUTPATIENT)
Dept: PRIMARY CARE CLINIC | Age: 75
End: 2020-12-07

## 2020-12-07 NOTE — TELEPHONE ENCOUNTER
[facility] pharmacist called. He wanted to clarify what was needed in regards to the PA for glucose strips. He states it is not the strips that are the problem it is the quantity of testing per day. She does not take insulin, Medicare only allows once a day testing.    Is it okay for her to go to once a day or what would be the reason for twice a day testing

## 2020-12-14 DIAGNOSIS — N18.30 CKD (CHRONIC KIDNEY DISEASE), STAGE III (HCC): ICD-10-CM

## 2020-12-14 LAB
ALBUMIN SERPL-MCNC: 4.5 G/DL (ref 3.4–5)
ANION GAP SERPL CALCULATED.3IONS-SCNC: 13 MMOL/L (ref 3–16)
BUN BLDV-MCNC: 13 MG/DL (ref 7–20)
CALCIUM SERPL-MCNC: 10.1 MG/DL (ref 8.3–10.6)
CHLORIDE BLD-SCNC: 101 MMOL/L (ref 99–110)
CO2: 23 MMOL/L (ref 21–32)
CREAT SERPL-MCNC: 1.4 MG/DL (ref 0.6–1.2)
GFR AFRICAN AMERICAN: 45
GFR NON-AFRICAN AMERICAN: 37
GLUCOSE BLD-MCNC: 169 MG/DL (ref 70–99)
PHOSPHORUS: 3.5 MG/DL (ref 2.5–4.9)
POTASSIUM SERPL-SCNC: 5.2 MMOL/L (ref 3.5–5.1)
SODIUM BLD-SCNC: 137 MMOL/L (ref 136–145)

## 2021-01-15 RX ORDER — ALBUTEROL SULFATE 90 UG/1
2 AEROSOL, METERED RESPIRATORY (INHALATION) 4 TIMES DAILY PRN
Qty: 3 INHALER | Refills: 1 | Status: SHIPPED | OUTPATIENT
Start: 2021-01-15 | End: 2022-08-05 | Stop reason: ALTCHOICE

## 2021-01-15 NOTE — TELEPHONE ENCOUNTER
[facility] faxed a letter stating the proair ordered on 11/4/2020  #3-1 refill is not covered. Alternative is Ventolin. This is on patient's list as well. Okay to refill? Rx is pended for review.

## 2021-01-20 ENCOUNTER — OFFICE VISIT (OUTPATIENT)
Dept: PRIMARY CARE CLINIC | Age: 76
End: 2021-01-20
Payer: MEDICARE

## 2021-01-20 VITALS
SYSTOLIC BLOOD PRESSURE: 128 MMHG | DIASTOLIC BLOOD PRESSURE: 66 MMHG | OXYGEN SATURATION: 100 % | TEMPERATURE: 97.5 F | WEIGHT: 136.8 LBS | HEIGHT: 64 IN | HEART RATE: 74 BPM | BODY MASS INDEX: 23.35 KG/M2

## 2021-01-20 DIAGNOSIS — Z78.0 POST-MENOPAUSAL: ICD-10-CM

## 2021-01-20 DIAGNOSIS — E26.9 HYPERALDOSTERONISM (HCC): ICD-10-CM

## 2021-01-20 DIAGNOSIS — E11.9 TYPE 2 DIABETES MELLITUS WITHOUT COMPLICATION, WITHOUT LONG-TERM CURRENT USE OF INSULIN (HCC): Primary | ICD-10-CM

## 2021-01-20 DIAGNOSIS — N18.30 STAGE 3 CHRONIC KIDNEY DISEASE, UNSPECIFIED WHETHER STAGE 3A OR 3B CKD (HCC): ICD-10-CM

## 2021-01-20 DIAGNOSIS — E78.2 MIXED HYPERLIPIDEMIA: ICD-10-CM

## 2021-01-20 DIAGNOSIS — J45.30 MILD PERSISTENT ASTHMA WITHOUT COMPLICATION: ICD-10-CM

## 2021-01-20 DIAGNOSIS — Z12.31 ENCOUNTER FOR SCREENING MAMMOGRAM FOR MALIGNANT NEOPLASM OF BREAST: ICD-10-CM

## 2021-01-20 DIAGNOSIS — I10 ESSENTIAL HYPERTENSION: ICD-10-CM

## 2021-01-20 LAB — HBA1C MFR BLD: 7.6 %

## 2021-01-20 PROCEDURE — G8400 PT W/DXA NO RESULTS DOC: HCPCS | Performed by: FAMILY MEDICINE

## 2021-01-20 PROCEDURE — 4040F PNEUMOC VAC/ADMIN/RCVD: CPT | Performed by: FAMILY MEDICINE

## 2021-01-20 PROCEDURE — G8484 FLU IMMUNIZE NO ADMIN: HCPCS | Performed by: FAMILY MEDICINE

## 2021-01-20 PROCEDURE — 2022F DILAT RTA XM EVC RTNOPTHY: CPT | Performed by: FAMILY MEDICINE

## 2021-01-20 PROCEDURE — 3017F COLORECTAL CA SCREEN DOC REV: CPT | Performed by: FAMILY MEDICINE

## 2021-01-20 PROCEDURE — 99214 OFFICE O/P EST MOD 30 MIN: CPT | Performed by: FAMILY MEDICINE

## 2021-01-20 PROCEDURE — 1090F PRES/ABSN URINE INCON ASSESS: CPT | Performed by: FAMILY MEDICINE

## 2021-01-20 PROCEDURE — G8420 CALC BMI NORM PARAMETERS: HCPCS | Performed by: FAMILY MEDICINE

## 2021-01-20 PROCEDURE — 83036 HEMOGLOBIN GLYCOSYLATED A1C: CPT | Performed by: FAMILY MEDICINE

## 2021-01-20 PROCEDURE — 1036F TOBACCO NON-USER: CPT | Performed by: FAMILY MEDICINE

## 2021-01-20 PROCEDURE — G8427 DOCREV CUR MEDS BY ELIG CLIN: HCPCS | Performed by: FAMILY MEDICINE

## 2021-01-20 PROCEDURE — 3051F HG A1C>EQUAL 7.0%<8.0%: CPT | Performed by: FAMILY MEDICINE

## 2021-01-20 PROCEDURE — 1123F ACP DISCUSS/DSCN MKR DOCD: CPT | Performed by: FAMILY MEDICINE

## 2021-01-20 RX ORDER — AMLODIPINE BESYLATE 2.5 MG/1
2.5 TABLET ORAL DAILY
Qty: 90 TABLET | Refills: 1 | Status: SHIPPED | OUTPATIENT
Start: 2021-01-20 | End: 2021-07-26 | Stop reason: SDUPTHER

## 2021-01-20 ASSESSMENT — PATIENT HEALTH QUESTIONNAIRE - PHQ9
SUM OF ALL RESPONSES TO PHQ QUESTIONS 1-9: 0
1. LITTLE INTEREST OR PLEASURE IN DOING THINGS: 0
SUM OF ALL RESPONSES TO PHQ QUESTIONS 1-9: 0

## 2021-01-23 ASSESSMENT — ENCOUNTER SYMPTOMS
BLOOD IN STOOL: 0
COUGH: 0
DIARRHEA: 0
CHEST TIGHTNESS: 0
VOMITING: 0
SHORTNESS OF BREATH: 0
TROUBLE SWALLOWING: 0
ABDOMINAL PAIN: 0
CONSTIPATION: 0
NAUSEA: 0

## 2021-01-23 NOTE — PROGRESS NOTES
[address] [facility] [address] PRIMARY CARE  [address] [place] [address]  Dept: [phone]  Dept Fax: [phone]     1/20/2021      Beatriz Eugene   1945     Chief Complaint   Patient presents with    Diabetes     check up        HPI  Pt comes in today for routine follow up DMII, HTN, HLD, CKDIII and mild persistent asthma. Chronic conditions stable. She has been following with Nephrology. Hyperaldosteronism. Now on aldactone which much improved BP. Potassium has been mildly elevated. She is trying to watch her diet per Nephro recommendations. No acute concerns. She is hopeful to get vaccinated soon so she can travel to [place] to see her family. + asthma exacerbation treated in August. No issues since. Using albuterol PRN. Denies hypoglycemia, CP, SOB, edema. UTD on colonoscopy done in [place]. Due for mammo/dexa. PHQ-9 Total Score: 0 (1/20/2021  3:50 PM)       Prior to Visit Medications    Medication Sig Taking? Authorizing Provider   PROAIR  (02 Base) MCG/ACT inhaler Inhale 2 puffs into the lungs every 6 hours as needed for Wheezing Yes [address], DO   amLODIPine (NORVASC) 2.5 MG tablet Take 1 tablet by mouth daily Yes Marjorie Bond, DO   albuterol sulfate HFA (VENTOLIN HFA) 108 (90 Base) MCG/ACT inhaler Inhale 2 puffs into the lungs 4 times daily as needed for Wheezing Yes Marjorie Bond, DO   spironolactone (ALDACTONE) 50 MG tablet Take 1 tablet by mouth daily Yes Daniela Villavicencio MD   blood glucose monitor strips Test 2 times a day & as needed for symptoms of irregular blood glucose. Supply onetouch ultra blue test strip. DX E11.9 Yes Marjorie Bond, DO   Lancets MISC 1 each by Does not apply route daily Test sugars twice daily.  Please supply onetouch delica lancets 00R mis  DXE11.9 Yes Marjorie Bond, DO   glipiZIDE (GLUCOTROL XL) 2.5 MG extended release tablet Take 1 tablet by mouth daily Yes [address], DO   pravastatin (PRAVACHOL) 80 MG tablet Take 1 tablet by mouth nightly Yes Marjorie Bond,    dorzolamide (TRUSOPT) 2 % ophthalmic solution Place 1 drop into both eyes 3 times daily Yes Historical Provider, MD   latanoprost (XALATAN) 0.005 % ophthalmic solution latanoprost 0.005 % eye drops   INSTILL 1 DROP INTO EACH EYE EVERY DAY AT BEDTIME Yes Historical Provider, MD   brimonidine (ALPHAGAN) 0.2 % ophthalmic solution 1 drop 3 times daily Yes Historical Provider, MD   vitamin D 25 MCG (1000 UT) CAPS Take by mouth Yes Historical Provider, MD   metoprolol succinate (TOPROL XL) 50 MG extended release tablet Take 1 tablet by mouth daily Yes [address], DO   SYMBICORT 80-4.5 MCG/ACT AERO Inhale 2 puffs into the lungs 2 times daily  Patient not taking: Reported on 1/20/2021  [address], DO        Past Medical History:   Diagnosis Date    Asthma     Chronic kidney disease     Glaucoma     Hyperlipidemia     Hypertension     Type 2 diabetes mellitus without complication ([facility] [address]         Social History     Tobacco Use    Smoking status: Never Smoker    Smokeless tobacco: Never Used   Substance Use Topics    Alcohol use: Never     Frequency: Never    Drug use: Never        Past Surgical History:   Procedure Laterality Date    COLONOSCOPY  05/26/2017    HYSTERECTOMY, TOTAL ABDOMINAL  2007    UPPER GASTROINTESTINAL ENDOSCOPY  05/26/2017        Allergies   Allergen Reactions    Lisinopril Other (See Comments)     cough        Family History   Problem Relation Age of Onset    High Blood Pressure Mother     Stroke Mother     High Blood Pressure Father     Heart Disease Father     High Blood Pressure Sister     Other Brother         Colon and GI cancer in multiple siblings (has 13 siblings)    Diabetes Brother     High Blood Pressure Brother         Patient's past medical history, surgical history, family history, medications, and allergies  were all reviewed and updated as appropriate today. Review of Systems   Constitutional: Negative for chills, fatigue, fever and unexpected weight change. HENT: Negative for trouble swallowing. Eyes: Negative for visual disturbance. Respiratory: Negative for cough, chest tightness and shortness of breath. Cardiovascular: Negative for chest pain, palpitations and leg swelling. Gastrointestinal: Negative for abdominal pain, blood in stool, constipation, diarrhea, nausea and vomiting. Endocrine: Negative for cold intolerance and heat intolerance. Musculoskeletal: Negative for gait problem. Skin: Negative for rash. Neurological: Negative for dizziness, tremors, syncope, weakness, light-headedness and headaches. Psychiatric/Behavioral: Negative for dysphoric mood and sleep disturbance. The patient is not nervous/anxious. /66 (Cuff Size: Medium Adult)   Pulse 74   Temp 97.5 °F (36.4 °C) (Temporal)   Ht 5' 3.75\" (1.619 m)   Wt 136 lb 12.8 oz (62.1 kg)   SpO2 100% Comment: room air  Breastfeeding No   BMI 23.67 kg/m²      Physical Exam  Vitals signs reviewed. Constitutional:       General: She is not in acute distress. Appearance: Normal appearance. She is well-developed. HENT:      Head: Normocephalic and atraumatic. Eyes:      General: No scleral icterus. Conjunctiva/sclera: Conjunctivae normal.      Pupils: Pupils are equal, round, and reactive to light. Neck:      Musculoskeletal: Neck supple. Thyroid: No thyromegaly. Cardiovascular:      Rate and Rhythm: Normal rate and regular rhythm. Heart sounds: No murmur. Pulmonary:      Effort: Pulmonary effort is normal. No respiratory distress. Breath sounds: Normal breath sounds. Abdominal:      General: Bowel sounds are normal. There is no distension. Palpations: Abdomen is soft. Tenderness: There is no abdominal tenderness. Musculoskeletal:      Right lower leg: No edema. Left lower leg: No edema. Feet:      Right foot:      Skin integrity: Skin integrity normal.      Left foot:      Skin integrity: Skin integrity normal.   Lymphadenopathy:      Cervical: No cervical adenopathy. Skin:     General: Skin is warm and dry. Capillary Refill: Capillary refill takes less than 2 seconds. Neurological:      General: No focal deficit present. Mental Status: She is alert and oriented to person, place, and time. Mental status is at baseline. Psychiatric:         Mood and Affect: Mood normal.         Assessment:  Encounter Diagnoses   Name Primary?  Essential hypertension     Mixed hyperlipidemia Yes    Stage 3 chronic kidney disease, unspecified whether stage 3a or 3b CKD     Type 2 diabetes mellitus without complication, without long-term current use of insulin (HCC)     Mild persistent asthma without complication     Hyperaldosteronism ([facility] [address]     Encounter for screening mammogram for malignant neoplasm of breast     Post-menopausal        Plan:    - Chronic conditions stable. BP/BS under good control. Continue follow up with Nephrology. - A1c in office = 7.6%. Goal 7-8%  - Mammo/dexa ordered. Declines pneumonia vaccination. New Prescriptions    No medications on file        Orders Placed This Encounter   Procedures    ZENA KARON DIGITAL SCREEN BILATERAL    DEXA BONE DENSITY 2 SITES    POCT glycosylated hemoglobin (Hb A1C)     DIABETES FOOT EXAM        Return in about 6 months (around 7/20/2021) for [address] [facility] - fasting.           [address], DO

## 2021-03-17 ENCOUNTER — TELEPHONE (OUTPATIENT)
Dept: PRIMARY CARE CLINIC | Age: 76
End: 2021-03-17

## 2021-03-17 DIAGNOSIS — E11.9 TYPE 2 DIABETES MELLITUS WITHOUT COMPLICATION, WITHOUT LONG-TERM CURRENT USE OF INSULIN (HCC): ICD-10-CM

## 2021-03-17 RX ORDER — GLUCOSAMINE HCL/CHONDROITIN SU 500-400 MG
CAPSULE ORAL
Qty: 100 STRIP | Refills: 3 | Status: SHIPPED | OUTPATIENT
Start: 2021-03-17 | End: 2021-09-07 | Stop reason: SDUPTHER

## 2021-03-17 NOTE — TELEPHONE ENCOUNTER
JJ FROM [facility] CALLING SAYING PT'S TEST STRIPS ARE NOT COVERED ON HER MEDICARE PART B BECAUSE MEDICARE WILL ONLY ALLOW FOR ONCE A DAY TESTING AND HER CURRENT RX IS FOR TWICE A DAY TESTING

## 2021-05-06 RX ORDER — GLIPIZIDE 2.5 MG/1
TABLET, EXTENDED RELEASE ORAL
Qty: 90 TABLET | Refills: 0 | Status: SHIPPED | OUTPATIENT
Start: 2021-05-06 | End: 2021-07-26 | Stop reason: SDUPTHER

## 2021-05-18 DIAGNOSIS — N18.30 STAGE 3 CHRONIC KIDNEY DISEASE, UNSPECIFIED WHETHER STAGE 3A OR 3B CKD (HCC): ICD-10-CM

## 2021-05-18 LAB
ALBUMIN SERPL-MCNC: 4.6 G/DL (ref 3.4–5)
ANION GAP SERPL CALCULATED.3IONS-SCNC: 14 MMOL/L (ref 3–16)
BUN BLDV-MCNC: 9 MG/DL (ref 7–20)
CALCIUM SERPL-MCNC: 9.8 MG/DL (ref 8.3–10.6)
CHLORIDE BLD-SCNC: 104 MMOL/L (ref 99–110)
CO2: 24 MMOL/L (ref 21–32)
CREAT SERPL-MCNC: 1.5 MG/DL (ref 0.6–1.2)
CREATININE URINE: 240.2 MG/DL (ref 28–259)
GFR AFRICAN AMERICAN: 41
GFR NON-AFRICAN AMERICAN: 34
GLUCOSE BLD-MCNC: 164 MG/DL (ref 70–99)
MICROALBUMIN UR-MCNC: <1.2 MG/DL
MICROALBUMIN/CREAT UR-RTO: NORMAL MG/G (ref 0–30)
PHOSPHORUS: 3.5 MG/DL (ref 2.5–4.9)
POTASSIUM SERPL-SCNC: 5.3 MMOL/L (ref 3.5–5.1)
SODIUM BLD-SCNC: 142 MMOL/L (ref 136–145)

## 2021-07-22 ASSESSMENT — LIFESTYLE VARIABLES
AUDIT TOTAL SCORE: INCOMPLETE
HOW OFTEN DO YOU HAVE A DRINK CONTAINING ALCOHOL: 0
HOW OFTEN DO YOU HAVE A DRINK CONTAINING ALCOHOL: NEVER
AUDIT-C TOTAL SCORE: INCOMPLETE

## 2021-07-22 ASSESSMENT — PATIENT HEALTH QUESTIONNAIRE - PHQ9
SUM OF ALL RESPONSES TO PHQ QUESTIONS 1-9: 0
SUM OF ALL RESPONSES TO PHQ9 QUESTIONS 1 & 2: 0
SUM OF ALL RESPONSES TO PHQ QUESTIONS 1-9: 0
2. FEELING DOWN, DEPRESSED OR HOPELESS: 0
SUM OF ALL RESPONSES TO PHQ QUESTIONS 1-9: 0
1. LITTLE INTEREST OR PLEASURE IN DOING THINGS: 0

## 2021-07-26 ENCOUNTER — OFFICE VISIT (OUTPATIENT)
Dept: PRIMARY CARE CLINIC | Age: 76
End: 2021-07-26
Payer: MEDICARE

## 2021-07-26 VITALS
HEART RATE: 70 BPM | SYSTOLIC BLOOD PRESSURE: 125 MMHG | BODY MASS INDEX: 24.92 KG/M2 | TEMPERATURE: 97.4 F | HEIGHT: 64 IN | WEIGHT: 146 LBS | DIASTOLIC BLOOD PRESSURE: 67 MMHG | OXYGEN SATURATION: 99 %

## 2021-07-26 DIAGNOSIS — N18.30 TYPE 2 DIABETES MELLITUS WITH STAGE 3 CHRONIC KIDNEY DISEASE, WITHOUT LONG-TERM CURRENT USE OF INSULIN, UNSPECIFIED WHETHER STAGE 3A OR 3B CKD (HCC): ICD-10-CM

## 2021-07-26 DIAGNOSIS — Z00.00 ROUTINE GENERAL MEDICAL EXAMINATION AT A HEALTH CARE FACILITY: Primary | ICD-10-CM

## 2021-07-26 DIAGNOSIS — I10 ESSENTIAL HYPERTENSION: ICD-10-CM

## 2021-07-26 DIAGNOSIS — E78.2 MIXED HYPERLIPIDEMIA: ICD-10-CM

## 2021-07-26 DIAGNOSIS — E11.22 TYPE 2 DIABETES MELLITUS WITH STAGE 3 CHRONIC KIDNEY DISEASE, WITHOUT LONG-TERM CURRENT USE OF INSULIN, UNSPECIFIED WHETHER STAGE 3A OR 3B CKD (HCC): ICD-10-CM

## 2021-07-26 PROCEDURE — 3051F HG A1C>EQUAL 7.0%<8.0%: CPT | Performed by: FAMILY MEDICINE

## 2021-07-26 PROCEDURE — 4040F PNEUMOC VAC/ADMIN/RCVD: CPT | Performed by: FAMILY MEDICINE

## 2021-07-26 PROCEDURE — 3017F COLORECTAL CA SCREEN DOC REV: CPT | Performed by: FAMILY MEDICINE

## 2021-07-26 PROCEDURE — G0439 PPPS, SUBSEQ VISIT: HCPCS | Performed by: FAMILY MEDICINE

## 2021-07-26 PROCEDURE — 1123F ACP DISCUSS/DSCN MKR DOCD: CPT | Performed by: FAMILY MEDICINE

## 2021-07-26 RX ORDER — AMLODIPINE BESYLATE 2.5 MG/1
2.5 TABLET ORAL DAILY
Qty: 90 TABLET | Refills: 1 | Status: SHIPPED | OUTPATIENT
Start: 2021-07-26 | End: 2022-01-05

## 2021-07-26 RX ORDER — METOPROLOL SUCCINATE 50 MG/1
TABLET, EXTENDED RELEASE ORAL
Qty: 90 TABLET | Refills: 1 | Status: SHIPPED | OUTPATIENT
Start: 2021-07-26 | End: 2022-01-26 | Stop reason: SDUPTHER

## 2021-07-26 RX ORDER — GLIPIZIDE 2.5 MG/1
TABLET, EXTENDED RELEASE ORAL
Qty: 90 TABLET | Refills: 1 | Status: SHIPPED | OUTPATIENT
Start: 2021-07-26 | End: 2022-01-04

## 2021-07-26 RX ORDER — BRIMONIDINE TARTRATE 2 MG/ML
1 SOLUTION/ DROPS OPHTHALMIC 3 TIMES DAILY
Qty: 1 BOTTLE | Refills: 0 | Status: SHIPPED | OUTPATIENT
Start: 2021-07-26

## 2021-07-26 RX ORDER — PRAVASTATIN SODIUM 80 MG/1
TABLET ORAL
Qty: 90 TABLET | Refills: 1 | Status: SHIPPED | OUTPATIENT
Start: 2021-07-26 | End: 2022-04-04

## 2021-07-26 NOTE — PROGRESS NOTES
Medicare Annual Wellness Visit  Name: Marquis Clark Date: 2021   MRN: <[id number]> Sex: Female   Age: 70 y.o. Ethnicity: Non- / Non    : 1945 Race: Thom Ibarra / Vlad Danielson is here for Medicare AWV    Screenings for behavioral, psychosocial and functional/safety risks, and cognitive dysfunction are all negative except as indicated below. These results, as well as other patient data from the [address] form, are documented in Flowsheets linked to this Encounter. Has mammo and dexa scheduled for October. Allergies   Allergen Reactions    Lisinopril Other (See Comments)     cough         Prior to Visit Medications    Medication Sig Taking? Authorizing Provider   brimonidine (ALPHAGAN) 0.2 % ophthalmic solution Place 1 drop into both eyes 3 times daily Yes Marjorie Bond, DO   metoprolol succinate (TOPROL XL) 50 MG extended release tablet Take 1 tablet by mouth once daily Yes  [address], DO   pravastatin (PRAVACHOL) 80 MG tablet Take 1 tablet by mouth nightly Yes Marjorie Bond, DO   glipiZIDE (GLUCOTROL XL) 2.5 MG extended release tablet Take 1 tablet by mouth once daily Yes Marjorie Bond, DO   amLODIPine (NORVASC) 2.5 MG tablet Take 1 tablet by mouth daily Yes Marjorie Bond, DO   spironolactone (ALDACTONE) 25 MG tablet Take 1 tablet by mouth daily Yes Josselin Knox MD   blood glucose monitor strips Test 1 time a day & as needed for symptoms of irregular blood glucose. Supply onetouch ultra blue test strip.  DX E11.9 Yes Marjorie Bond, DO   PROAIR  (90 Base) MCG/ACT inhaler Inhale 2 puffs into the lungs every 6 hours as needed for Wheezing Yes Marjorie Bond, DO   albuterol sulfate HFA (VENTOLIN HFA) 108 (90 Base) MCG/ACT inhaler Inhale 2 puffs into the lungs 4 times daily as needed for Wheezing Yes  [address], DO   Lancets MISC 1 each by Does not apply route daily Test sugars twice daily. Please supply onetouch delica lancets 76W mis  DXE11.9 Yes Marjorie Bond DO   dorzolamide (TRUSOPT) 2 % ophthalmic solution Place 1 drop into both eyes 3 times daily Yes Historical Provider, MD   latanoprost (XALATAN) 0.005 % ophthalmic solution latanoprost 0.005 % eye drops   INSTILL 1 DROP INTO EACH EYE EVERY DAY AT BEDTIME Yes Historical Provider, MD   vitamin D 25 MCG (1000 UT) CAPS Take by mouth Yes Historical Provider, MD         Past Medical History:   Diagnosis Date    Asthma     Chronic kidney disease     Glaucoma     Hyperlipidemia     Hypertension     Type 2 diabetes mellitus without complication ([facility] [address]        Past Surgical History:   Procedure Laterality Date    COLONOSCOPY  05/26/2017    HYSTERECTOMY, TOTAL ABDOMINAL  2007    UPPER GASTROINTESTINAL ENDOSCOPY  05/26/2017         Family History   Problem Relation Age of Onset    High Blood Pressure Mother     Stroke Mother     High Blood Pressure Father     Heart Disease Father     High Blood Pressure Sister     Other Brother         Colon and GI cancer in multiple siblings (has 13 siblings)    Diabetes Brother     High Blood Pressure Brother        CareTeam (Including outside providers/suppliers regularly involved in providing care):   Patient Care Team:  Padma Casey DO as PCP - General (Family Medicine)  Padma Casey DO as PCP - [facility] Empaneled Provider    Wt Readings from Last 3 Encounters:   07/26/21 146 lb (66.2 kg)   05/25/21 143 lb 12.8 oz (65.2 kg)   01/20/21 136 lb 12.8 oz (62.1 kg)     Vitals:    07/26/21 0821   BP: 125/67   Pulse: 70   Temp: 97.4 °F (36.3 °C)   SpO2: 99%   Weight: 146 lb (66.2 kg)   Height: 5' 4\" (1.626 m)     Body mass index is 25.06 kg/m². Based upon direct observation of the patient, evaluation of cognition reveals recent and remote memory intact.       Patient's complete Health Risk Assessment and screening values have been reviewed and are found in [facility]. The following problems were reviewed today and where indicated follow up appointments were made and/or referrals ordered. Positive Risk Factor Screenings with Interventions:            General Health and ACP:  General  In general, how would you say your health is?: Good  In the past 7 days, have you experienced any of the following? New or Increased Pain, New or Increased Fatigue, Loneliness, Social Isolation, Stress or Anger?: None of These  Do you get the social and emotional support that you need?: Yes  Do you have a Living Will?: (!) No  Advance Directives     Power of [address] Will ACP-Advance Directive ACP-Power of     Not on File Not on File Not on File Not on File      General Health Risk Interventions:  · No Living Will: Advance Care Planning addressed with patient today    Health Habits/Nutrition:  Health Habits/Nutrition  Do you exercise for at least 20 minutes 2-3 times per week?: Yes  Have you lost any weight without trying in the past 3 months?: No  Do you eat only one meal per day?: No  Have you seen the dentist within the past year?: (!) No  Body mass index: (!) 25.06  Health Habits/Nutrition Interventions:  · Dental exam overdue:  patient encouraged to make appointment with his/her dentist    Hearing/Vision:  No exam data present  Hearing/Vision  Do you or your family notice any trouble with your hearing that hasn't been managed with hearing aids?: No  Do you have difficulty driving, watching TV, or doing any of your daily activities because of your eyesight?: No  Have you had an eye exam within the past year?: (!) No  Hearing/Vision Interventions:  · Vision concerns:  patient encouraged to make appointment with his/her eye specialist - patient has appt in August     ADL:  ADLs  In the past 7 days, did you need help from others to perform any of the following everyday activities?  Eating, dressing, grooming, bathing, toileting, or walking/balance?: None  In the past 7 days, did you need help from others to take care of any of the following? Laundry, housekeeping, banking/finances, shopping, telephone use, food preparation, transportation, or taking medications?: (!) Transportation  ADL Interventions:  · Patient declines any further evaluation/treatment for this issue    Personalized Preventive Plan   Current Health Maintenance Status  Immunization History   Administered Date(s) Administered    COVID-19, Mayo Peter, PF, 30mcg/0.3mL 02/01/2021, 02/19/2021    Influenza A (H5N1) Monovalent Vaccine, Adjuvanted-2013 09/20/2016    Influenza Virus Vaccine 11/01/2017, 11/01/2019, 10/01/2020        Health Maintenance   Topic Date Due    Hepatitis C screen  Never done    Diabetic retinal exam  Never done    DTaP/Tdap/Td vaccine (1 - Tdap) Never done    Colon cancer screen colonoscopy  Never done    Breast cancer screen  Never done    Shingles Vaccine (1 of 2) Never done    DEXA (modify frequency per FRAX score)  Never done    Pneumococcal 65+ years Vaccine (1 of 1 - PPSV23) Never done   [employer] Visit (AWV)  Never done    Lipid screen  08/12/2021    Flu vaccine (1) 09/01/2021    Diabetic foot exam  01/20/2022    A1C test (Diabetic or Prediabetic)  01/20/2022    Diabetic microalbuminuria test  05/18/2022    Potassium monitoring  05/18/2022    Creatinine monitoring  05/18/2022    COVID-19 Vaccine  Completed    Hepatitis A vaccine  Aged Out    Hib vaccine  Aged Out    Meningococcal (ACWY) vaccine  Aged Out     Recommendations for [employer] Due: see orders and patient instructions/AVS.  . Recommended screening schedule for the next 5-10 years is provided to the patient in written form: see Patient Angela Haile was seen today for medicare awv.     Diagnoses and all orders for this visit:    Routine general medical examination at a health care facility    Type 2 diabetes mellitus with stage 3 chronic kidney disease, without long-term current use of insulin, unspecified whether stage 3a or 3b CKD ([facility] [address]  -     HEMOGLOBIN A1C; Future  -     LIPID PANEL; Future  -     CBC WITH AUTO DIFFERENTIAL; Future    Essential hypertension  -     metoprolol succinate (TOPROL XL) 50 MG extended release tablet; Take 1 tablet by mouth once daily  -     amLODIPine (NORVASC) 2.5 MG tablet; Take 1 tablet by mouth daily    Mixed hyperlipidemia  -     pravastatin (PRAVACHOL) 80 MG tablet; Take 1 tablet by mouth nightly  -     LIPID PANEL; Future  -     HEPATIC FUNCTION PANEL; Future    Other orders  -     brimonidine (ALPHAGAN) 0.2 % ophthalmic solution; Place 1 drop into both eyes 3 times daily  -     glipiZIDE (GLUCOTROL XL) 2.5 MG extended release tablet; Take 1 tablet by mouth once daily           Declines pneumonia vaccine today but agrees to get it next visit. UTD on colonoscopy done in [place]. Kidney function stable. Following with Dr. Carley Burns. Recent adjustment of spironolactone. Dexa/mammo scheduled for October. Has eye exam scheduled for next month.

## 2021-07-26 NOTE — PATIENT INSTRUCTIONS
Personalized Preventive Plan for Alberto Barone - 7/26/2021  Medicare offers a range of preventive health benefits. Some of the tests and screenings are paid in full while other may be subject to a deductible, co-insurance, and/or copay. Some of these benefits include a comprehensive review of your medical history including lifestyle, illnesses that may run in your family, and various assessments and screenings as appropriate. After reviewing your medical record and screening and assessments performed today your provider may have ordered immunizations, labs, imaging, and/or referrals for you. A list of these orders (if applicable) as well as your Preventive Care list are included within your After Visit Summary for your review. Other Preventive Recommendations:    · A preventive eye exam performed by an eye specialist is recommended every 1-2 years to screen for glaucoma; cataracts, macular degeneration, and other eye disorders. · A preventive dental visit is recommended every 6 months. · Try to get at least 150 minutes of exercise per week or 10,000 steps per day on a pedometer . · Order or download the FREE \"Exercise & Physical Activity: Your Everyday Guide\" from [employer]. Call 9-219.517.1864 or search [employer] online. · You need 9240-7973 mg of calcium and 5972-7500 IU of vitamin D per day. It is possible to meet your calcium requirement with diet alone, but a vitamin D supplement is usually necessary to meet this goal.  · When exposed to the sun, use a sunscreen that protects against both UVA and UVB radiation with an SPF of 30 or greater. Reapply every 2 to 3 hours or after sweating, drying off with a towel, or swimming. · Always wear a seat belt when traveling in a car. Always wear a helmet when riding a bicycle or motorcycle.

## 2021-08-04 RX ORDER — LANCETS 33 GAUGE
EACH MISCELLANEOUS
Qty: 200 EACH | Refills: 1 | Status: SHIPPED | OUTPATIENT
Start: 2021-08-04

## 2021-08-17 ENCOUNTER — TELEPHONE (OUTPATIENT)
Dept: PRIMARY CARE CLINIC | Age: 76
End: 2021-08-17

## 2021-08-17 NOTE — TELEPHONE ENCOUNTER
Pt calling for refill on nebullizer solution--Albuteral 83% vials  To be called in to [address]    She says she has enough for tonite    Last ov = mawv 7-26-21 hc  Next ov 1-26-22 hc

## 2021-08-18 RX ORDER — ALBUTEROL SULFATE 2.5 MG/3ML
2.5 SOLUTION RESPIRATORY (INHALATION) EVERY 6 HOURS PRN
Qty: 120 EACH | Refills: 3 | Status: SHIPPED | OUTPATIENT
Start: 2021-08-18 | End: 2022-04-05 | Stop reason: SDUPTHER

## 2021-09-07 DIAGNOSIS — E11.9 TYPE 2 DIABETES MELLITUS WITHOUT COMPLICATION, WITHOUT LONG-TERM CURRENT USE OF INSULIN (HCC): ICD-10-CM

## 2021-09-07 NOTE — TELEPHONE ENCOUNTER
Armida from [facility] calling saying she called last week for refill on pt's One Touch Ultra  Blue test strips    Armida says pt says she tests twice a day and her current rx says once a day    Armida requests for the name of the test strips as well as the dx code be on the rx    Last ov = mawv 7-26-21 [facility].  Next ov 1-26-22 [facility].

## 2021-09-08 RX ORDER — GLUCOSAMINE HCL/CHONDROITIN SU 500-400 MG
CAPSULE ORAL
Qty: 100 STRIP | Refills: 3 | Status: SHIPPED | OUTPATIENT
Start: 2021-09-08 | End: 2021-10-06 | Stop reason: SDUPTHER

## 2021-10-06 DIAGNOSIS — E11.9 TYPE 2 DIABETES MELLITUS WITHOUT COMPLICATION, WITHOUT LONG-TERM CURRENT USE OF INSULIN (HCC): ICD-10-CM

## 2021-10-06 RX ORDER — GLUCOSAMINE HCL/CHONDROITIN SU 500-400 MG
CAPSULE ORAL
Qty: 200 STRIP | Refills: 3 | Status: SHIPPED | OUTPATIENT
Start: 2021-10-06 | End: 2022-01-04

## 2021-10-06 NOTE — TELEPHONE ENCOUNTER
Pt calling saying she will soon be going to [place] until January 2022.     She is requesting 90-day supplies with a refill on ProAir HFA and One Touch Ultra Test Strips    To be sent to [address] [place] [address] Avenue    She is requesting the refills so she can have the rx's transferred to [place] when she needs them

## 2022-01-03 DIAGNOSIS — I10 ESSENTIAL HYPERTENSION: ICD-10-CM

## 2022-01-03 DIAGNOSIS — E11.9 TYPE 2 DIABETES MELLITUS WITHOUT COMPLICATION, WITHOUT LONG-TERM CURRENT USE OF INSULIN (HCC): ICD-10-CM

## 2022-01-05 RX ORDER — AMLODIPINE BESYLATE 2.5 MG/1
TABLET ORAL
Qty: 90 TABLET | Refills: 1 | Status: SHIPPED | OUTPATIENT
Start: 2022-01-05 | End: 2022-07-19 | Stop reason: SDUPTHER

## 2022-01-05 RX ORDER — BLOOD SUGAR DIAGNOSTIC
STRIP MISCELLANEOUS
Qty: 100 EACH | Refills: 0 | Status: SHIPPED | OUTPATIENT
Start: 2022-01-05 | End: 2022-02-16

## 2022-01-05 RX ORDER — GLIPIZIDE 2.5 MG/1
TABLET, EXTENDED RELEASE ORAL
Qty: 90 TABLET | Refills: 1 | Status: SHIPPED | OUTPATIENT
Start: 2022-01-05 | End: 2022-08-03

## 2022-01-18 ENCOUNTER — TELEPHONE (OUTPATIENT)
Dept: ORTHOPEDIC SURGERY | Age: 77
End: 2022-01-18

## 2022-01-18 NOTE — TELEPHONE ENCOUNTER
PA submitted via [facility] for ProAir  (90 Base)MCG/ACT aerosol. Key: [id number] - PA Case ID: [id number]    APPROVED today per [facility]. Coverage Start Date:12/19/2021  Coverage End Date:01/18/2023    Please advise patient. Thank you!

## 2022-01-19 DIAGNOSIS — E11.22 TYPE 2 DIABETES MELLITUS WITH STAGE 3 CHRONIC KIDNEY DISEASE, WITHOUT LONG-TERM CURRENT USE OF INSULIN, UNSPECIFIED WHETHER STAGE 3A OR 3B CKD (HCC): ICD-10-CM

## 2022-01-19 DIAGNOSIS — N18.30 TYPE 2 DIABETES MELLITUS WITH STAGE 3 CHRONIC KIDNEY DISEASE, WITHOUT LONG-TERM CURRENT USE OF INSULIN, UNSPECIFIED WHETHER STAGE 3A OR 3B CKD (HCC): ICD-10-CM

## 2022-01-19 DIAGNOSIS — E78.2 MIXED HYPERLIPIDEMIA: ICD-10-CM

## 2022-01-19 LAB
ALBUMIN SERPL-MCNC: 4.5 G/DL (ref 3.4–5)
ALP BLD-CCNC: 115 U/L (ref 40–129)
ALT SERPL-CCNC: 14 U/L (ref 10–40)
AST SERPL-CCNC: 20 U/L (ref 15–37)
BASOPHILS ABSOLUTE: 0 K/UL (ref 0–0.2)
BASOPHILS RELATIVE PERCENT: 0.3 %
BILIRUB SERPL-MCNC: 0.6 MG/DL (ref 0–1)
BILIRUBIN DIRECT: <0.2 MG/DL (ref 0–0.3)
BILIRUBIN, INDIRECT: NORMAL MG/DL (ref 0–1)
CHOLESTEROL, TOTAL: 184 MG/DL (ref 0–199)
EOSINOPHILS ABSOLUTE: 0.3 K/UL (ref 0–0.6)
EOSINOPHILS RELATIVE PERCENT: 2.3 %
HCT VFR BLD CALC: 37.7 % (ref 36–48)
HDLC SERPL-MCNC: 62 MG/DL (ref 40–60)
HEMOGLOBIN: 12.2 G/DL (ref 12–16)
LDL CHOLESTEROL CALCULATED: 102 MG/DL
LYMPHOCYTES ABSOLUTE: 3.6 K/UL (ref 1–5.1)
LYMPHOCYTES RELATIVE PERCENT: 31.1 %
MCH RBC QN AUTO: 27.3 PG (ref 26–34)
MCHC RBC AUTO-ENTMCNC: 32.3 G/DL (ref 31–36)
MCV RBC AUTO: 84.4 FL (ref 80–100)
MONOCYTES ABSOLUTE: 0.7 K/UL (ref 0–1.3)
MONOCYTES RELATIVE PERCENT: 6.3 %
NEUTROPHILS ABSOLUTE: 6.9 K/UL (ref 1.7–7.7)
NEUTROPHILS RELATIVE PERCENT: 60 %
PDW BLD-RTO: 13.8 % (ref 12.4–15.4)
PLATELET # BLD: 382 K/UL (ref 135–450)
PMV BLD AUTO: 7 FL (ref 5–10.5)
RBC # BLD: 4.46 M/UL (ref 4–5.2)
TOTAL PROTEIN: 6.7 G/DL (ref 6.4–8.2)
TRIGL SERPL-MCNC: 102 MG/DL (ref 0–150)
VLDLC SERPL CALC-MCNC: 20 MG/DL
WBC # BLD: 11.5 K/UL (ref 4–11)

## 2022-01-20 LAB
ESTIMATED AVERAGE GLUCOSE: 171.4 MG/DL
HBA1C MFR BLD: 7.6 %

## 2022-01-26 ENCOUNTER — OFFICE VISIT (OUTPATIENT)
Dept: PRIMARY CARE CLINIC | Age: 77
End: 2022-01-26
Payer: MEDICARE

## 2022-01-26 VITALS
SYSTOLIC BLOOD PRESSURE: 138 MMHG | WEIGHT: 139.2 LBS | HEART RATE: 76 BPM | TEMPERATURE: 97.1 F | DIASTOLIC BLOOD PRESSURE: 66 MMHG | BODY MASS INDEX: 23.89 KG/M2 | OXYGEN SATURATION: 97 %

## 2022-01-26 DIAGNOSIS — N18.30 TYPE 2 DIABETES MELLITUS WITH STAGE 3 CHRONIC KIDNEY DISEASE, WITHOUT LONG-TERM CURRENT USE OF INSULIN, UNSPECIFIED WHETHER STAGE 3A OR 3B CKD (HCC): ICD-10-CM

## 2022-01-26 DIAGNOSIS — I10 ESSENTIAL HYPERTENSION: ICD-10-CM

## 2022-01-26 DIAGNOSIS — E11.22 TYPE 2 DIABETES MELLITUS WITH STAGE 3 CHRONIC KIDNEY DISEASE, WITHOUT LONG-TERM CURRENT USE OF INSULIN, UNSPECIFIED WHETHER STAGE 3A OR 3B CKD (HCC): ICD-10-CM

## 2022-01-26 DIAGNOSIS — Z78.0 POST-MENOPAUSAL: ICD-10-CM

## 2022-01-26 DIAGNOSIS — Z12.31 ENCOUNTER FOR SCREENING MAMMOGRAM FOR MALIGNANT NEOPLASM OF BREAST: ICD-10-CM

## 2022-01-26 DIAGNOSIS — N18.30 STAGE 3 CHRONIC KIDNEY DISEASE, UNSPECIFIED WHETHER STAGE 3A OR 3B CKD (HCC): ICD-10-CM

## 2022-01-26 DIAGNOSIS — E26.9 HYPERALDOSTERONISM (HCC): Primary | ICD-10-CM

## 2022-01-26 PROCEDURE — 1036F TOBACCO NON-USER: CPT | Performed by: FAMILY MEDICINE

## 2022-01-26 PROCEDURE — 1090F PRES/ABSN URINE INCON ASSESS: CPT | Performed by: FAMILY MEDICINE

## 2022-01-26 PROCEDURE — G8484 FLU IMMUNIZE NO ADMIN: HCPCS | Performed by: FAMILY MEDICINE

## 2022-01-26 PROCEDURE — G8420 CALC BMI NORM PARAMETERS: HCPCS | Performed by: FAMILY MEDICINE

## 2022-01-26 PROCEDURE — 4040F PNEUMOC VAC/ADMIN/RCVD: CPT | Performed by: FAMILY MEDICINE

## 2022-01-26 PROCEDURE — 2022F DILAT RTA XM EVC RTNOPTHY: CPT | Performed by: FAMILY MEDICINE

## 2022-01-26 PROCEDURE — 99214 OFFICE O/P EST MOD 30 MIN: CPT | Performed by: FAMILY MEDICINE

## 2022-01-26 PROCEDURE — 3017F COLORECTAL CA SCREEN DOC REV: CPT | Performed by: FAMILY MEDICINE

## 2022-01-26 PROCEDURE — G8427 DOCREV CUR MEDS BY ELIG CLIN: HCPCS | Performed by: FAMILY MEDICINE

## 2022-01-26 PROCEDURE — G8400 PT W/DXA NO RESULTS DOC: HCPCS | Performed by: FAMILY MEDICINE

## 2022-01-26 PROCEDURE — 1123F ACP DISCUSS/DSCN MKR DOCD: CPT | Performed by: FAMILY MEDICINE

## 2022-01-26 PROCEDURE — 3051F HG A1C>EQUAL 7.0%<8.0%: CPT | Performed by: FAMILY MEDICINE

## 2022-01-26 RX ORDER — SPIRONOLACTONE 25 MG/1
25 TABLET ORAL DAILY
Qty: 90 TABLET | Refills: 1 | Status: SHIPPED | OUTPATIENT
Start: 2022-01-26 | End: 2022-07-19 | Stop reason: SDUPTHER

## 2022-01-26 RX ORDER — METOPROLOL SUCCINATE 50 MG/1
TABLET, EXTENDED RELEASE ORAL
Qty: 90 TABLET | Refills: 1 | Status: SHIPPED | OUTPATIENT
Start: 2022-01-26 | End: 2022-08-11

## 2022-01-26 NOTE — PROGRESS NOTES
[address] [facility] [address] PRIMARY CARE  [address] [place] [id number]  Dept: [phone]  Dept Fax: [phone]     1/26/2022      Dg Bunch   1945     Chief Complaint   Patient presents with    Diabetes       HPI  Pt comes in today for follow up DMII, CKDIII, asthma, HLD and HTN. a1c = 7.6%. Only on glipizide 2.5mg. Denies hypoglycemia. Not checking blood sugars. Spending part of the year in [place] with family. BP has been stable since starting spironolactone. Still following with Dr. Jefferson Keys. Colonoscopy was done in [place] - Dr. Alonso Lucero. 2018. Have not gotten records yet. Prior to Visit Medications    Medication Sig Taking?  Authorizing Provider   amLODIPine (NORVASC) 2.5 MG tablet Take 1 tablet by mouth once daily Yes Kailee Mata, DO   glipiZIDE (GLUCOTROL XL) 2.5 MG extended release tablet Take 1 tablet by mouth once daily Yes Marjorie Bond, DO   PROAIR  (90 Base) MCG/ACT inhaler Inhale 2 puffs into the lungs every 6 hours as needed for Wheezing Yes Marjorie Bond DO   albuterol (PROVENTIL) (2.5 MG/3ML) 0.083% nebulizer solution Take 3 mLs by nebulization every 6 hours as needed for Wheezing Yes Kailee Mata, DO   OneTouch Delica Lancets 08Y MISC Check glucose twice daily, DX E11.9  Marjorie Bond DO   brimonidine (ALPHAGAN) 0.2 % ophthalmic solution Place 1 drop into both eyes 3 times daily Yes Marjorie Bond DO   metoprolol succinate (TOPROL XL) 50 MG extended release tablet Take 1 tablet by mouth once daily Yes Marjorie Bond DO   pravastatin (PRAVACHOL) 80 MG tablet Take 1 tablet by mouth nightly Yes Marjorie Bond DO   spironolactone (ALDACTONE) 25 MG tablet Take 1 tablet by mouth daily Yes Aly Dorantes MD   albuterol sulfate HFA (VENTOLIN HFA) 108 (90 Base) MCG/ACT inhaler Inhale 2 puffs into the lungs 4 times daily as needed for Wheezing Yes Marjorie Bond DO   dorzolamide (TRUSOPT) 2 % ophthalmic solution Place 1 drop into both eyes 3 times daily Yes Historical Provider, MD   latanoprost (XALATAN) 0.005 % ophthalmic solution latanoprost 0.005 % eye drops   INSTILL 1 DROP INTO EACH EYE EVERY DAY AT BEDTIME Yes Historical Provider, MD   vitamin D 25 MCG (1000 UT) CAPS Take by mouth Yes Historical Provider, MD   blood glucose test strips (ONETOUCH ULTRA) strip USE 1 STRIP TO CHECK GLUCOSE ONCE DAILY AS NEEDED  Marjorie Bond DO   Lancets MISC 1 each by Does not apply route daily Test sugars twice daily. Please supply onetouch delica lancets 07P mis  DXE11.9  Marjorie Youssef DO        Past Medical History:   Diagnosis Date    Asthma     Chronic kidney disease     Glaucoma     Hyperlipidemia     Hypertension     Type 2 diabetes mellitus without complication (HCC)         Social History     Tobacco Use    Smoking status: Never Smoker    Smokeless tobacco: Never Used   Substance Use Topics    Alcohol use: Never    Drug use: Never        Past Surgical History:   Procedure Laterality Date    COLONOSCOPY  05/26/2017    HYSTERECTOMY, TOTAL ABDOMINAL  2007    UPPER GASTROINTESTINAL ENDOSCOPY  05/26/2017        Allergies   Allergen Reactions    Lisinopril Other (See Comments)     cough        Family History   Problem Relation Age of Onset    High Blood Pressure Mother     Stroke Mother     High Blood Pressure Father     Heart Disease Father     High Blood Pressure Sister     Other Brother         Colon and GI cancer in multiple siblings (has 13 siblings)   Brandie Youngfarhat Diabetes Brother     High Blood Pressure Brother         Patient's past medical history, surgical history, family history, medications, and allergies  were all reviewed and updated as appropriate today. Review of Systems   Constitutional: Negative for chills, fever and unexpected weight change.    Respiratory: Negative for shortness of breath. Cardiovascular: Negative for chest pain. Gastrointestinal: Negative for abdominal pain, diarrhea, nausea and vomiting. /66 (Cuff Size: Medium Adult)   Pulse 76   Temp 97.1 °F (36.2 °C) (Temporal)   Wt 139 lb 3.2 oz (63.1 kg)   SpO2 97% Comment: room air  Breastfeeding No   BMI 23.89 kg/m²      Physical Exam  Vitals reviewed. Constitutional:       General: She is not in acute distress. Appearance: Normal appearance. She is well-developed. HENT:      Head: Normocephalic. Eyes:      General: No scleral icterus. Conjunctiva/sclera: Conjunctivae normal.   Neck:      Thyroid: No thyromegaly. Cardiovascular:      Rate and Rhythm: Normal rate and regular rhythm. Heart sounds: No murmur heard. Pulmonary:      Effort: Pulmonary effort is normal. No respiratory distress. Breath sounds: Normal breath sounds. Abdominal:      General: Bowel sounds are normal. There is no distension. Palpations: Abdomen is soft. Tenderness: There is no abdominal tenderness. Musculoskeletal:      Cervical back: Neck supple. Right lower leg: No edema. Left lower leg: No edema. Lymphadenopathy:      Cervical: No cervical adenopathy. Skin:     General: Skin is warm and dry. Capillary Refill: Capillary refill takes less than 2 seconds. Neurological:      General: No focal deficit present. Mental Status: She is alert and oriented to person, place, and time. Mental status is at baseline. Psychiatric:         Mood and Affect: Mood normal.         Assessment:  Encounter Diagnoses   Name Primary?     Essential hypertension     Hyperaldosteronism (HCC) Yes    Type 2 diabetes mellitus with stage 3 chronic kidney disease, without long-term current use of insulin, unspecified whether stage 3a or 3b CKD (HCC)     Stage 3 chronic kidney disease, unspecified whether stage 3a or 3b CKD ([facility] [address]     Encounter for screening mammogram for malignant neoplasm of breast     Post-menopausal        Plan:    - Chronic conditions stable. A1c = 7.6%. Goal <8% due to risk of hypoglycemia.   - Continue follow up with Nephrology as scheduled. - Will try again to get records of colonoscopy 2018. - Declined pneumococcal vaccination. New Prescriptions    No medications on file        Orders Placed This Encounter   Procedures    ZENA KARON DIGITAL SCREEN BILATERAL    DEXA BONE DENSITY 2 SITES        Return in about 6 months (around 7/26/2022) for [address] (non-fasting). 30 total minutes were spent in direct patient care including chart review, face-to-face consultation and documentation.      [address], DO

## 2022-01-27 ENCOUNTER — TELEPHONE (OUTPATIENT)
Dept: PRIMARY CARE CLINIC | Age: 77
End: 2022-01-27

## 2022-01-27 NOTE — TELEPHONE ENCOUNTER
Pt calling back saying she was seen yesterday and  did not have a copy of her recent colonoscopy.       She says she called [facility] and was told to have the office fax them a release to [phone], attn: medical records    She says she signed a release \"awhile ago\" if they will accept that

## 2022-01-28 ASSESSMENT — ENCOUNTER SYMPTOMS
VOMITING: 0
NAUSEA: 0
SHORTNESS OF BREATH: 0
ABDOMINAL PAIN: 0
DIARRHEA: 0

## 2022-02-15 DIAGNOSIS — N18.30 STAGE 3 CHRONIC KIDNEY DISEASE, UNSPECIFIED WHETHER STAGE 3A OR 3B CKD (HCC): ICD-10-CM

## 2022-02-15 LAB
ALBUMIN SERPL-MCNC: 4.5 G/DL (ref 3.4–5)
ANION GAP SERPL CALCULATED.3IONS-SCNC: 13 MMOL/L (ref 3–16)
BUN BLDV-MCNC: 14 MG/DL (ref 7–20)
CALCIUM SERPL-MCNC: 10.1 MG/DL (ref 8.3–10.6)
CHLORIDE BLD-SCNC: 100 MMOL/L (ref 99–110)
CO2: 23 MMOL/L (ref 21–32)
CREAT SERPL-MCNC: 1.4 MG/DL (ref 0.6–1.2)
GFR AFRICAN AMERICAN: 44
GFR NON-AFRICAN AMERICAN: 37
GLUCOSE BLD-MCNC: 145 MG/DL (ref 70–99)
PHOSPHORUS: 4 MG/DL (ref 2.5–4.9)
POTASSIUM SERPL-SCNC: 5.3 MMOL/L (ref 3.5–5.1)
SODIUM BLD-SCNC: 136 MMOL/L (ref 136–145)

## 2022-02-16 DIAGNOSIS — E11.9 TYPE 2 DIABETES MELLITUS WITHOUT COMPLICATION, WITHOUT LONG-TERM CURRENT USE OF INSULIN (HCC): ICD-10-CM

## 2022-02-16 RX ORDER — BLOOD SUGAR DIAGNOSTIC
STRIP MISCELLANEOUS
Qty: 100 EACH | Refills: 0 | Status: SHIPPED | OUTPATIENT
Start: 2022-02-16 | End: 2022-05-11 | Stop reason: SDUPTHER

## 2022-02-23 ENCOUNTER — HOSPITAL ENCOUNTER (OUTPATIENT)
Dept: MAMMOGRAPHY | Age: 77
Discharge: HOME | End: 2022-02-28
Payer: MEDICARE

## 2022-02-23 VITALS — BODY MASS INDEX: 23.9 KG/M2 | WEIGHT: 140 LBS | HEIGHT: 64 IN

## 2022-02-23 DIAGNOSIS — Z12.31 VISIT FOR SCREENING MAMMOGRAM: ICD-10-CM

## 2022-02-23 PROCEDURE — 77067 SCR MAMMO BI INCL CAD: CPT

## 2022-03-28 RX ORDER — LANCETS 33 GAUGE
EACH MISCELLANEOUS
Qty: 100 EACH | Refills: 3 | Status: SHIPPED | OUTPATIENT
Start: 2022-03-28 | End: 2022-08-26 | Stop reason: SDUPTHER

## 2022-04-04 ENCOUNTER — TELEPHONE (OUTPATIENT)
Dept: ORTHOPEDIC SURGERY | Age: 77
End: 2022-04-04

## 2022-04-04 DIAGNOSIS — E78.2 MIXED HYPERLIPIDEMIA: ICD-10-CM

## 2022-04-04 RX ORDER — PRAVASTATIN SODIUM 80 MG/1
TABLET ORAL
Qty: 90 TABLET | Refills: 1 | Status: SHIPPED | OUTPATIENT
Start: 2022-04-04 | End: 2022-10-12

## 2022-04-05 RX ORDER — ALBUTEROL SULFATE 2.5 MG/3ML
2.5 SOLUTION RESPIRATORY (INHALATION) EVERY 6 HOURS PRN
Qty: 90 EACH | Refills: 3 | Status: SHIPPED | OUTPATIENT
Start: 2022-04-05 | End: 2022-10-26

## 2022-04-05 NOTE — TELEPHONE ENCOUNTER
Received DENIAL for Albuterol Sulfate (2.5 MG/3ML)0.083% nebulizer solution under Medicare Part D. However, coverage may be available under Medicare Part A or Part B. Denial letter attached.

## 2022-04-05 NOTE — TELEPHONE ENCOUNTER
Spoke with pharmacy. It looks like patient paid out of pocket with a coupon ($11). Picked up on 3.27.22. Refill pending for next fill.   Amount changed to #90 because this is how many come in a box

## 2022-05-11 DIAGNOSIS — E11.9 TYPE 2 DIABETES MELLITUS WITHOUT COMPLICATION, WITHOUT LONG-TERM CURRENT USE OF INSULIN (HCC): ICD-10-CM

## 2022-05-11 RX ORDER — BLOOD SUGAR DIAGNOSTIC
STRIP MISCELLANEOUS
Qty: 100 EACH | Refills: 5 | Status: SHIPPED | OUTPATIENT
Start: 2022-05-11

## 2022-07-12 DIAGNOSIS — N18.30 STAGE 3 CHRONIC KIDNEY DISEASE, UNSPECIFIED WHETHER STAGE 3A OR 3B CKD (HCC): ICD-10-CM

## 2022-07-12 LAB
ALBUMIN SERPL-MCNC: 4.8 G/DL (ref 3.4–5)
ANION GAP SERPL CALCULATED.3IONS-SCNC: 14 MMOL/L (ref 3–16)
BUN BLDV-MCNC: 12 MG/DL (ref 7–20)
CALCIUM SERPL-MCNC: 10.2 MG/DL (ref 8.3–10.6)
CHLORIDE BLD-SCNC: 96 MMOL/L (ref 99–110)
CO2: 23 MMOL/L (ref 21–32)
CREAT SERPL-MCNC: 1.7 MG/DL (ref 0.6–1.2)
GFR AFRICAN AMERICAN: 35
GFR NON-AFRICAN AMERICAN: 29
GLUCOSE BLD-MCNC: 152 MG/DL (ref 70–99)
PHOSPHORUS: 3.8 MG/DL (ref 2.5–4.9)
POTASSIUM SERPL-SCNC: 5.5 MMOL/L (ref 3.5–5.1)
SODIUM BLD-SCNC: 133 MMOL/L (ref 136–145)

## 2022-07-19 PROBLEM — N18.4 CKD (CHRONIC KIDNEY DISEASE) STAGE 4, GFR 15-29 ML/MIN (HCC): Status: ACTIVE | Noted: 2022-07-19

## 2022-08-01 ENCOUNTER — TELEPHONE (OUTPATIENT)
Dept: PRIMARY CARE CLINIC | Age: 77
End: 2022-08-01

## 2022-08-01 NOTE — TELEPHONE ENCOUNTER
----- Message from [facility] sent at 7/25/2022  4:45 PM EDT -----  Subject: Appointment Request    Reason for Call: Established Patient Appointment needed: Routine Medicare   AWV    QUESTIONS    Reason for appointment request? No appointments available during search     Additional Information for Provider? patient was schedule for AMWV 7/27/22   but cancelled please contact her with a new time & date around 10A nothing   populated for new date & time  ---------------------------------------------------------------------------  --------------  Umberto Saha [facility]  [phone]; OK to leave message on voicemail  ---------------------------------------------------------------------------  --------------  SCRIPT ANSWERS  COVID Screen: Marcus Curiel
Pt already on the schedule for 8-8
0 = understands/communicates without difficulty

## 2022-08-03 RX ORDER — GLIPIZIDE 2.5 MG/1
TABLET, EXTENDED RELEASE ORAL
Qty: 90 TABLET | Refills: 1 | Status: SHIPPED | OUTPATIENT
Start: 2022-08-03

## 2022-08-05 ENCOUNTER — OFFICE VISIT (OUTPATIENT)
Dept: PRIMARY CARE CLINIC | Age: 77
End: 2022-08-05
Payer: MEDICARE

## 2022-08-05 VITALS
TEMPERATURE: 97.2 F | OXYGEN SATURATION: 97 % | BODY MASS INDEX: 25.06 KG/M2 | DIASTOLIC BLOOD PRESSURE: 63 MMHG | WEIGHT: 146.8 LBS | HEIGHT: 64 IN | HEART RATE: 75 BPM | SYSTOLIC BLOOD PRESSURE: 137 MMHG

## 2022-08-05 DIAGNOSIS — E11.22 TYPE 2 DIABETES MELLITUS WITH STAGE 3 CHRONIC KIDNEY DISEASE, WITHOUT LONG-TERM CURRENT USE OF INSULIN, UNSPECIFIED WHETHER STAGE 3A OR 3B CKD (HCC): ICD-10-CM

## 2022-08-05 DIAGNOSIS — Z00.00 MEDICARE ANNUAL WELLNESS VISIT, SUBSEQUENT: Primary | ICD-10-CM

## 2022-08-05 DIAGNOSIS — N18.4 CKD (CHRONIC KIDNEY DISEASE) STAGE 4, GFR 15-29 ML/MIN (HCC): ICD-10-CM

## 2022-08-05 DIAGNOSIS — N18.30 TYPE 2 DIABETES MELLITUS WITH STAGE 3 CHRONIC KIDNEY DISEASE, WITHOUT LONG-TERM CURRENT USE OF INSULIN, UNSPECIFIED WHETHER STAGE 3A OR 3B CKD (HCC): ICD-10-CM

## 2022-08-05 LAB — HBA1C MFR BLD: 7.7 %

## 2022-08-05 PROCEDURE — 3051F HG A1C>EQUAL 7.0%<8.0%: CPT | Performed by: FAMILY MEDICINE

## 2022-08-05 PROCEDURE — 83036 HEMOGLOBIN GLYCOSYLATED A1C: CPT | Performed by: FAMILY MEDICINE

## 2022-08-05 PROCEDURE — G0439 PPPS, SUBSEQ VISIT: HCPCS | Performed by: FAMILY MEDICINE

## 2022-08-05 PROCEDURE — 3017F COLORECTAL CA SCREEN DOC REV: CPT | Performed by: FAMILY MEDICINE

## 2022-08-05 PROCEDURE — 1123F ACP DISCUSS/DSCN MKR DOCD: CPT | Performed by: FAMILY MEDICINE

## 2022-08-05 SDOH — ECONOMIC STABILITY: FOOD INSECURITY: WITHIN THE PAST 12 MONTHS, THE FOOD YOU BOUGHT JUST DIDN'T LAST AND YOU DIDN'T HAVE MONEY TO GET MORE.: NEVER TRUE

## 2022-08-05 SDOH — ECONOMIC STABILITY: FOOD INSECURITY: WITHIN THE PAST 12 MONTHS, YOU WORRIED THAT YOUR FOOD WOULD RUN OUT BEFORE YOU GOT MONEY TO BUY MORE.: NEVER TRUE

## 2022-08-05 ASSESSMENT — PATIENT HEALTH QUESTIONNAIRE - PHQ9
SUM OF ALL RESPONSES TO PHQ QUESTIONS 1-9: 0
2. FEELING DOWN, DEPRESSED OR HOPELESS: 0
SUM OF ALL RESPONSES TO PHQ9 QUESTIONS 1 & 2: 0
SUM OF ALL RESPONSES TO PHQ QUESTIONS 1-9: 0
SUM OF ALL RESPONSES TO PHQ QUESTIONS 1-9: 0
1. LITTLE INTEREST OR PLEASURE IN DOING THINGS: 0
SUM OF ALL RESPONSES TO PHQ QUESTIONS 1-9: 0

## 2022-08-05 ASSESSMENT — LIFESTYLE VARIABLES: HOW OFTEN DO YOU HAVE A DRINK CONTAINING ALCOHOL: NEVER

## 2022-08-05 ASSESSMENT — SOCIAL DETERMINANTS OF HEALTH (SDOH): HOW HARD IS IT FOR YOU TO PAY FOR THE VERY BASICS LIKE FOOD, HOUSING, MEDICAL CARE, AND HEATING?: NOT HARD AT ALL

## 2022-08-05 NOTE — PROGRESS NOTES
Medicare Annual Wellness Visit    David Boone is here for Medicare AWV    Assessment & Plan   Medicare annual wellness visit, subsequent  CKD (chronic kidney disease) stage 4, GFR 15-29 ml/min (HCC)  Type 2 diabetes mellitus with stage 3 chronic kidney disease, without long-term current use of insulin, unspecified whether stage 3a or 3b CKD (HCC)  -     POCT glycosylated hemoglobin (Hb A1C)    Recommendations for Preventive Services Due: see orders and patient instructions/AVS.  Recommended screening schedule for the next 5-10 years is provided to the patient in written form: see Patient Instructions/AVS.     Return in 6 months (on 2/5/2023) for Follow up diabetes. Subjective       Patient's complete Health Risk Assessment and screening values have been reviewed and are found in Flowsheets. The following problems were reviewed today and where indicated follow up appointments were made and/or referrals ordered. Positive Risk Factor Screenings with Interventions:             General Health and ACP:  General  In general, how would you say your health is?: Good  In the past 7 days, have you experienced any of the following: New or Increased Pain, New or Increased Fatigue, Loneliness, Social Isolation, Stress or Anger?: No  Do you get the social and emotional support that you need?: Yes  Do you have a Living Will?: (!) No    Advance Directives       Power of  Living Will ACP-Advance Directive ACP-Power of     Not on File Not on File Not on File Not on File          General Health Risk Interventions:  No Living Will: Advance Care Planning addressed with patient today              Objective   Vitals:    08/05/22 0949   BP: 137/63   Cuff Size: Medium Adult   Pulse: 75   Temp: 97.2 °F (36.2 °C)   TempSrc: Temporal   SpO2: 97%   Weight: 146 lb 12.8 oz (66.6 kg)   Height: 5' 4\" (1.626 m)      Body mass index is 25.2 kg/m².             Allergies   Allergen Reactions    Lisinopril Other (See Comments) cough     Prior to Visit Medications    Medication Sig Taking? Authorizing Provider   glipiZIDE (GLUCOTROL XL) 2.5 MG extended release tablet Take 1 tablet by mouth once daily Yes aMrjorie Bond DO   PROAIR  (90 Base) MCG/ACT inhaler INHALE 2 PUFFS BY MOUTH EVERY 6 HOURS AS NEEDED FOR WHEEZING Yes Marjorie Bond DO   spironolactone (ALDACTONE) 25 MG tablet Take 0.5 tablets by mouth in the morning. Yes Caleb Smith MD   amLODIPine (NORVASC) 2.5 MG tablet Take 1 tablet by mouth in the morning. Yes Caleb Smith MD   blood glucose test strips (ONETOUCH ULTRA) strip Use 1 strip to check glucose twice daily as needed for symptoms of irregular blood glucose.   DX: E11.9  Marjorie Bond DO   albuterol (PROVENTIL) (2.5 MG/3ML) 0.083% nebulizer solution Take 3 mLs by nebulization every 6 hours as needed for Wheezing Yes Niyah Banda,    pravastatin (PRAVACHOL) 80 MG tablet Take 1 tablet by mouth nightly Yes Marjorie Bond DO   metoprolol succinate (TOPROL XL) 50 MG extended release tablet Take 1 tablet by mouth once daily Yes Marjorie Bond DO   brimonidine (ALPHAGAN) 0.2 % ophthalmic solution Place 1 drop into both eyes 3 times daily Yes Marjorie Bond DO   dorzolamide (TRUSOPT) 2 % ophthalmic solution Place 1 drop into both eyes 3 times daily Yes Historical Provider, MD   vitamin D 25 MCG (1000 UT) CAPS Take by mouth Yes Historical Provider, MD   Lancets ([address]) [address] [facility] [address] USE 1 LANCET TO [address], DO   OneTouch Delica Lancets 28G MISC Check glucose twice daily, DX E11.9  Marjorie Bond DO   latanoprost (XALATAN) 0.005 % ophthalmic solution latanoprost 0.005 % eye drops   INSTILL 1 DROP INTO EACH EYE EVERY DAY AT BEDTIME  Historical Provider, MD Love (Including outside providers/suppliers regularly involved in providing care):   Patient Care Team:  Cheyanne Hutchison DO as PCP - General (Family Medicine)  Cheyanne Hutchison DO as PCP - REHABILITATION [facility] Empaneled Provider     Reviewed and updated this visit:  Allergies

## 2022-08-05 NOTE — PATIENT INSTRUCTIONS
[facility] Laboratory Locations - No appointment necessary. @ indicates the location is open Saturdays in addition to Monday through Friday. Call your preferred location for test preparation, business hours and other information you need. [employer] accepts [employer]. [facility]    @ [facility] [address]   Ph: [phone] [facility] S[facility] [address]   Ph: [phone]  @ [facility] S[facility] [address] [facility]   Ph: [phone]    [facility] Sv[facility]. [address]   Ph: [phone] @ [place] [facility] [address]  Ph: [phone] @ [facility] Svcs. [address] [place] [address]. [address]   Ph: [phone]     [facility] Svcs. [address]  Ph: [phone]    [place]   @ [place] [facility] [address] [place], [place] [address]   Ph: [phone] [facility] [address]  Ph: [address] [facility] [place] [phone]   [facility]:  [address] S[address] [facility]   Ph: [phone] [facility]. Lab Svcs[address] [place], [place] [address]   Ph: [phone]       Personalized Preventive Plan for Kim Calvert - 8/5/2022  Medicare offers a range of preventive health benefits. Some of the tests and screenings are paid in full while other may be subject to a deductible, co-insurance, and/or copay. Some of these benefits include a comprehensive review of your medical history including lifestyle, illnesses that may run in your family, and various assessments and screenings as appropriate.     After reviewing your medical record and screening and assessments performed today your provider may have ordered immunizations, labs, imaging, and/or referrals for you. A list of these orders (if applicable) as well as your Preventive Care list are included within your After Visit Summary for your review. Other Preventive Recommendations:    A preventive eye exam performed by an eye specialist is recommended every 1-2 years to screen for glaucoma; cataracts, macular degeneration, and other eye disorders. A preventive dental visit is recommended every 6 months. Try to get at least 150 minutes of exercise per week or 10,000 steps per day on a pedometer . Order or download the FREE \"Exercise & Physical Activity: Your Everyday Guide\" from [employer]. Call 5-860.696.8615 or search [employer] online. You need 5122-0864 mg of calcium and 6754-8962 IU of vitamin D per day. It is possible to meet your calcium requirement with diet alone, but a vitamin D supplement is usually necessary to meet this goal.  When exposed to the sun, use a sunscreen that protects against both UVA and UVB radiation with an SPF of 30 or greater. Reapply every 2 to 3 hours or after sweating, drying off with a towel, or swimming. Always wear a seat belt when traveling in a car. Always wear a helmet when riding a bicycle or motorcycle.

## 2022-08-11 DIAGNOSIS — I10 ESSENTIAL HYPERTENSION: ICD-10-CM

## 2022-08-11 RX ORDER — METOPROLOL SUCCINATE 50 MG/1
TABLET, EXTENDED RELEASE ORAL
Qty: 90 TABLET | Refills: 1 | Status: SHIPPED | OUTPATIENT
Start: 2022-08-11

## 2022-08-26 RX ORDER — LANCETS 33 GAUGE
EACH MISCELLANEOUS
Qty: 300 EACH | Refills: 3 | Status: SHIPPED | OUTPATIENT
Start: 2022-08-26

## 2022-10-12 DIAGNOSIS — E78.2 MIXED HYPERLIPIDEMIA: ICD-10-CM

## 2022-10-12 RX ORDER — PRAVASTATIN SODIUM 80 MG/1
TABLET ORAL
Qty: 90 TABLET | Refills: 0 | Status: SHIPPED | OUTPATIENT
Start: 2022-10-12

## 2022-10-26 RX ORDER — ALBUTEROL SULFATE 2.5 MG/3ML
SOLUTION RESPIRATORY (INHALATION)
Qty: 90 ML | Refills: 0 | Status: SHIPPED | OUTPATIENT
Start: 2022-10-26

## 2022-11-03 NOTE — TELEPHONE ENCOUNTER
Pt calling about this refill for her nebulizer solution. She says she needs to take this with her to [place] and she is leaving 11-11.     She would appreciate this being called in

## 2022-11-08 ENCOUNTER — TELEPHONE (OUTPATIENT)
Dept: ORTHOPEDIC SURGERY | Age: 77
End: 2022-11-08

## 2022-11-08 NOTE — TELEPHONE ENCOUNTER
Submitted PA for Albuterol Sulfate (2.5 MG/3ML)0.083% nebulizer solution  Via CMM Key: [id number] STATUS: DENIED under Medicare Part D; however, Medicare Part B covers the requested drug when used in the home with a nebulizer. Denial letter attached. If this requires a response please respond to the pool ( P [id number] [facility]). Thank you please advise patient.

## 2022-11-09 RX ORDER — ALBUTEROL SULFATE 90 UG/1
2 AEROSOL, METERED RESPIRATORY (INHALATION) EVERY 6 HOURS PRN
Qty: 18 G | Refills: 3 | Status: SHIPPED | OUTPATIENT
Start: 2022-11-09 | End: 2022-11-30 | Stop reason: SDUPTHER

## 2022-11-09 NOTE — TELEPHONE ENCOUNTER
Pt calling about her Dione Carroll saying she has no refills remaining.   She says she needs to take this with her to [place]    Last ov = mawv 8-5-22 [facility].  Next ov 2-3-23 [facility].    She says she has the nebulizer solution already

## 2022-11-09 NOTE — TELEPHONE ENCOUNTER
I accidentally refused medication thinking it was albuterol nebulizer.        Medication:   Requested Prescriptions     Refused Prescriptions Disp Refills    PROAIR  (90 Base) MCG/ACT inhaler [Pharmacy Med Name: ProAir  (90 Base) MCG/ACT Inhalation Aerosol Solution] 18 g 0     Sig: INHALE 2 PUFFS BY MOUTH EVERY 6 HOURS AS NEEDED FOR WHEEZING     Refused By: Ronda Tinajero     Reason for Refusal: Refill not appropriate     Last ordered  8/3/22 with 2 refills    Last appt: 8/5/2022   Next appt: 2/3/2023

## 2022-11-30 ENCOUNTER — TELEPHONE (OUTPATIENT)
Dept: PRIMARY CARE CLINIC | Age: 77
End: 2022-11-30

## 2022-11-30 RX ORDER — ALBUTEROL SULFATE 90 UG/1
2 AEROSOL, METERED RESPIRATORY (INHALATION) EVERY 6 HOURS PRN
Qty: 18 G | Refills: 3 | Status: SHIPPED | OUTPATIENT
Start: 2022-11-30

## 2022-11-30 RX ORDER — LANCETS 33 GAUGE
EACH MISCELLANEOUS
Qty: 200 EACH | Refills: 0 | OUTPATIENT
Start: 2022-11-30

## 2022-11-30 RX ORDER — LANCETS 33 GAUGE
EACH MISCELLANEOUS
Qty: 300 EACH | Refills: 3 | Status: SHIPPED | OUTPATIENT
Start: 2022-11-30

## 2023-01-04 NOTE — TELEPHONE ENCOUNTER
Pt calling saying she is still in [place] and needs vials for her nebulizer    Using [facility] [phone]    Last ov = mawv 8-5-22 [place] [facility].  Next ov 2-3-23 [place] [facility].

## 2023-01-05 RX ORDER — ALBUTEROL SULFATE 2.5 MG/3ML
SOLUTION RESPIRATORY (INHALATION)
Qty: 90 ML | Refills: 0 | Status: SHIPPED | OUTPATIENT
Start: 2023-01-05

## 2023-01-26 DIAGNOSIS — E78.2 MIXED HYPERLIPIDEMIA: ICD-10-CM

## 2023-01-27 RX ORDER — PRAVASTATIN SODIUM 80 MG/1
TABLET ORAL
Qty: 90 TABLET | Refills: 1 | Status: SHIPPED | OUTPATIENT
Start: 2023-01-27

## 2023-02-13 DIAGNOSIS — I10 ESSENTIAL HYPERTENSION: ICD-10-CM

## 2023-02-13 RX ORDER — GLIPIZIDE 2.5 MG/1
TABLET, EXTENDED RELEASE ORAL
Qty: 90 TABLET | Refills: 1 | Status: SHIPPED | OUTPATIENT
Start: 2023-02-13

## 2023-02-13 RX ORDER — METOPROLOL SUCCINATE 50 MG/1
TABLET, EXTENDED RELEASE ORAL
Qty: 90 TABLET | Refills: 1 | Status: SHIPPED | OUTPATIENT
Start: 2023-02-13

## 2023-02-13 NOTE — TELEPHONE ENCOUNTER
Pt requesting refill on Glipizide and Metoprolol to be sent to [facility] [phone]    Last ov = mawv 8-5-22 [facility].  Next ov 2-21-23 [facility].

## 2023-02-21 ENCOUNTER — OFFICE VISIT (OUTPATIENT)
Dept: PRIMARY CARE CLINIC | Age: 78
End: 2023-02-21

## 2023-02-21 VITALS
SYSTOLIC BLOOD PRESSURE: 144 MMHG | DIASTOLIC BLOOD PRESSURE: 71 MMHG | HEART RATE: 71 BPM | WEIGHT: 137.8 LBS | BODY MASS INDEX: 23.65 KG/M2 | TEMPERATURE: 98 F

## 2023-02-21 DIAGNOSIS — N18.4 CKD (CHRONIC KIDNEY DISEASE) STAGE 4, GFR 15-29 ML/MIN (HCC): ICD-10-CM

## 2023-02-21 DIAGNOSIS — E11.22 TYPE 2 DIABETES MELLITUS WITH STAGE 3 CHRONIC KIDNEY DISEASE, WITHOUT LONG-TERM CURRENT USE OF INSULIN, UNSPECIFIED WHETHER STAGE 3A OR 3B CKD (HCC): ICD-10-CM

## 2023-02-21 DIAGNOSIS — N18.30 STAGE 3 CHRONIC KIDNEY DISEASE, UNSPECIFIED WHETHER STAGE 3A OR 3B CKD (HCC): ICD-10-CM

## 2023-02-21 DIAGNOSIS — N18.30 TYPE 2 DIABETES MELLITUS WITH STAGE 3 CHRONIC KIDNEY DISEASE, WITHOUT LONG-TERM CURRENT USE OF INSULIN, UNSPECIFIED WHETHER STAGE 3A OR 3B CKD (HCC): ICD-10-CM

## 2023-02-21 DIAGNOSIS — J45.30 MILD PERSISTENT ASTHMA WITHOUT COMPLICATION: Primary | ICD-10-CM

## 2023-02-21 DIAGNOSIS — E26.9 HYPERALDOSTERONISM (HCC): ICD-10-CM

## 2023-02-21 LAB — HBA1C MFR BLD: 7.5 %

## 2023-02-21 RX ORDER — ALBUTEROL SULFATE 2.5 MG/3ML
SOLUTION RESPIRATORY (INHALATION)
Qty: 90 ML | Refills: 0 | Status: SHIPPED | OUTPATIENT
Start: 2023-02-21 | End: 2023-02-24 | Stop reason: SDUPTHER

## 2023-02-21 SDOH — ECONOMIC STABILITY: FOOD INSECURITY: WITHIN THE PAST 12 MONTHS, THE FOOD YOU BOUGHT JUST DIDN'T LAST AND YOU DIDN'T HAVE MONEY TO GET MORE.: NEVER TRUE

## 2023-02-21 SDOH — ECONOMIC STABILITY: HOUSING INSECURITY
IN THE LAST 12 MONTHS, WAS THERE A TIME WHEN YOU DID NOT HAVE A STEADY PLACE TO SLEEP OR SLEPT IN A SHELTER (INCLUDING NOW)?: NO

## 2023-02-21 SDOH — ECONOMIC STABILITY: INCOME INSECURITY: HOW HARD IS IT FOR YOU TO PAY FOR THE VERY BASICS LIKE FOOD, HOUSING, MEDICAL CARE, AND HEATING?: NOT HARD AT ALL

## 2023-02-21 SDOH — ECONOMIC STABILITY: FOOD INSECURITY: WITHIN THE PAST 12 MONTHS, YOU WORRIED THAT YOUR FOOD WOULD RUN OUT BEFORE YOU GOT MONEY TO BUY MORE.: NEVER TRUE

## 2023-02-21 ASSESSMENT — PATIENT HEALTH QUESTIONNAIRE - PHQ9
SUM OF ALL RESPONSES TO PHQ9 QUESTIONS 1 & 2: 0
SUM OF ALL RESPONSES TO PHQ QUESTIONS 1-9: 0
2. FEELING DOWN, DEPRESSED OR HOPELESS: 0
SUM OF ALL RESPONSES TO PHQ QUESTIONS 1-9: 0
1. LITTLE INTEREST OR PLEASURE IN DOING THINGS: 0

## 2023-02-21 NOTE — PROGRESS NOTES
[address] [facility] [address] PRIMARY CARE  [address] [place] [address]  Dept: [phone]  Dept Fax: [phone]     2/21/2023      Halima Laws   1945     Chief Complaint   Patient presents with    Diabetes       HPI    Pt comes in today for follow up DMII/HTN/CKDIII. Poc a1c = 7.5%. AM blood sugars stay consistently < 150. No hypoglycemia but does report blood sugars have been running better since returning from [place]. Blood pressure has been stable. No issues with her medications. High anxiety over transportation issues and missing appts. Will be seeing Dr. Omer Steiner in the near future. Asthma has been stable. No recent flares. PHQ-9 Total Score: 0 (2/21/2023 10:05 AM)       Prior to Visit Medications    Medication Sig Taking? Authorizing Provider   glipiZIDE (GLUCOTROL XL) 2.5 MG extended release tablet Take 1 tablet by mouth once daily Yes Amaury Singh, DO   metoprolol succinate (TOPROL XL) 50 MG extended release tablet Take 1 tablet by mouth once daily Yes Amaury Singh, DO   pravastatin (PRAVACHOL) 80 MG tablet Take 1 tablet by mouth nightly Yes Marjorie Bond, DO   albuterol (PROVENTIL) (2.5 MG/3ML) 0.083% nebulizer solution USE 1 VIAL IN NEBULIZER EVERY 6 HOURS AS NEEDED FOR WHEEZING Yes Marjorie Bond, DO   albuterol sulfate HFA (PROAIR HFA) 108 (90 Base) MCG/ACT inhaler Inhale 2 puffs into the lungs every 6 hours as needed for Wheezing Yes Marjorie Bond, DO   spironolactone (ALDACTONE) 25 MG tablet Take 0.5 tablets by mouth in the morning. Yes Praveen Christianson MD   amLODIPine (NORVASC) 2.5 MG tablet Take 1 tablet by mouth in the morning. Yes Praveen Christianson MD   blood glucose test strips (ONETOUCH ULTRA) strip Use 1 strip to check glucose twice daily as needed for symptoms of irregular blood glucose.   DX: E11.9  Marjorie Bond, DO   brimonidine (ALPHAGAN) 0.2 % ophthalmic solution Place 1 drop into both eyes 3 times daily Yes Marjorie Bond, DO   dorzolamide (TRUSOPT) 2 % ophthalmic solution Place 1 drop into both eyes 3 times daily Yes Historical Provider, MD   latanoprost (XALATAN) 0.005 % ophthalmic solution latanoprost 0.005 % eye drops   INSTILL 1 DROP INTO EACH EYE EVERY DAY AT BEDTIME Yes Historical Provider, MD   vitamin D 25 MCG (1000 UT) CAPS Take by mouth Yes Historical Provider, MD   Lancets ([address]) [address] [facility] USE 1 LANCET TO CHECK GLUCOSE TWICE DAILY  DX  E11.9  [address], DO   OneTouch Delica Lancets 79Y MISC Check glucose twice daily, DX E11.9  [address], DO        Past Medical History:   Diagnosis Date    Asthma     Chronic kidney disease     Glaucoma     Hyperlipidemia     Hypertension     Type 2 diabetes mellitus without complication ([facility] [address]         Social History     Tobacco Use    Smoking status: Never    Smokeless tobacco: Never   Substance Use Topics    Alcohol use: Never    Drug use: Never        Past Surgical History:   Procedure Laterality Date    BREAST BIOPSY      COLONOSCOPY  05/26/2017    TOTAL ABDOMINAL HYSTERECTOMY  2007    UPPER GASTROINTESTINAL ENDOSCOPY  05/26/2017        Allergies   Allergen Reactions    Lisinopril Other (See Comments)     cough        Family History   Problem Relation Age of Onset    High Blood Pressure Mother     Stroke Mother     High Blood Pressure Father     Heart Disease Father     High Blood Pressure Sister     Other Brother         Colon and GI cancer in multiple siblings (has 13 siblings)    Diabetes Brother     High Blood Pressure Brother         Patient's past medical history, surgical history, family history, medications, and allergies  were all reviewed and updated as appropriate today. Review of Systems   Constitutional:  Negative for chills, fever and unexpected weight change. Respiratory:  Negative for shortness of breath. Cardiovascular:  Negative for chest pain. Gastrointestinal:  Negative for abdominal pain, diarrhea, nausea and vomiting. BP (!) 144/71   Pulse 71   Temp 98 °F (36.7 °C)   Wt 137 lb 12.8 oz (62.5 kg)   BMI 23.65 kg/m²      Physical Exam  Constitutional:       General: She is not in acute distress. Appearance: Normal appearance. She is not ill-appearing. Cardiovascular:      Rate and Rhythm: Normal rate. Pulmonary:      Effort: Pulmonary effort is normal.   Musculoskeletal:      Right lower leg: No edema. Left lower leg: No edema. Neurological:      General: No focal deficit present. Mental Status: She is alert. Psychiatric:         Mood and Affect: Mood normal.       Assessment:  Encounter Diagnoses   Name Primary? Type 2 diabetes mellitus with stage 3 chronic kidney disease, without long-term current use of insulin, unspecified whether stage 3a or 3b CKD (HCC)     CKD (chronic kidney disease) stage 4, GFR 15-29 ml/min ([facility])     Hyperaldosteronism (HCC)     Mild persistent asthma without complication Yes    Stage 3 chronic kidney disease, unspecified whether stage 3a or 3b CKD ([facility]        Plan:    - Chronic conditions stable. Blood sugars are trending down. Continue ADA diet. Will follow up with nephrology. Fasting labs. New Prescriptions    No medications on file        Orders Placed This Encounter   Procedures    Comprehensive Metabolic Panel    Lipid, Fasting    CBC with Auto Differential    POCT glycosylated hemoglobin (Hb A1C)        Return in about 6 months (around 8/21/2023) for MAWV - non-fasting.          [address], DO

## 2023-02-21 NOTE — PATIENT INSTRUCTIONS
[facility] Laboratory Locations - No appointment necessary. @ indicates the location is open Saturdays in addition to Monday through Friday. Call your preferred location for test preparation, business hours and other information you need. [employer] accepts [employer]. [facility]     @ [address] [facility] [address] [facility]. [place], [address]    Ph: [address]   [address] [facility] ISSVISH, [address]    Ph: [phone]   @ [address] [place] [address].[facility]    Ph: Arthur [address] Doming[address]    Ph: [phone]  @ [address]   Ph: [phone]  @ [address] [place] [address]. [facility]    Ph: [address] [place] [address] 19   Ph: [phone]    [place]    @ [facility]. Debbie Oquendo., [place] [address]    Ph: [phone]  [facility]   [address] [facility], [address]   Ph: Ysitie [address] Shira Atkins. [address] 30: 311 [facility] Cecilio Matthews    Ph: [phone]   [place] [id number] 2026 [facility]. Cecilio Miranda   Ph: 501 [facility] Med  [address]. [facility] [place] [address]    Ph: [phone]

## 2023-02-23 ASSESSMENT — ENCOUNTER SYMPTOMS
SHORTNESS OF BREATH: 0
VOMITING: 0
ABDOMINAL PAIN: 0
DIARRHEA: 0
NAUSEA: 0

## 2023-02-24 RX ORDER — ALBUTEROL SULFATE 2.5 MG/3ML
SOLUTION RESPIRATORY (INHALATION)
Qty: 90 ML | Refills: 0 | Status: SHIPPED | OUTPATIENT
Start: 2023-02-24

## 2023-02-24 NOTE — TELEPHONE ENCOUNTER
[facility] faxed a request for more information for the nebulizer. They need a diagnosis code. Rx pended with the DX of J45.30.

## 2023-02-28 ENCOUNTER — HOSPITAL ENCOUNTER (OUTPATIENT)
Dept: MAMMOGRAPHY | Age: 78
Discharge: HOME | End: 2023-02-28
Payer: MEDICARE

## 2023-02-28 VITALS — WEIGHT: 137 LBS | HEIGHT: 65 IN | BODY MASS INDEX: 22.82 KG/M2

## 2023-02-28 DIAGNOSIS — Z12.31 VISIT FOR SCREENING MAMMOGRAM: ICD-10-CM

## 2023-02-28 PROCEDURE — 77067 SCR MAMMO BI INCL CAD: CPT

## 2023-05-19 DIAGNOSIS — E11.9 TYPE 2 DIABETES MELLITUS WITHOUT COMPLICATION, WITHOUT LONG-TERM CURRENT USE OF INSULIN (HCC): ICD-10-CM

## 2023-05-22 RX ORDER — BLOOD SUGAR DIAGNOSTIC
STRIP MISCELLANEOUS
Qty: 50 EACH | Refills: 0 | Status: SHIPPED | OUTPATIENT
Start: 2023-05-22

## 2023-05-22 NOTE — TELEPHONE ENCOUNTER
Medication:   Requested Prescriptions     Pending Prescriptions Disp Refills    blood glucose test strips (ONETOUCH ULTRA) strip [Pharmacy Med Name: OneTouch Ultra Blue In Vitro Strip] 50 each 0     Sig: USE 1 STRIP TO CHECK GLUCOSE TWICE DAILY AS NEEDED FOR  SYMPTOMS  OF  IRREGULAR  BLOOD  GLUCOSE     Last Filled:  4/25/2023    Last appt: 2/21/2023   Next appt: 8/7/2023

## 2023-06-07 RX ORDER — ALBUTEROL SULFATE 2.5 MG/3ML
SOLUTION RESPIRATORY (INHALATION)
Qty: 90 ML | Refills: 0 | Status: SHIPPED | OUTPATIENT
Start: 2023-06-07

## 2023-06-07 NOTE — TELEPHONE ENCOUNTER
Pt calling for refill on the vials for her nebulizer to be called in to [facility] [phone]    Last ov 2-21-23 [facility].  Next ov = jacob 8-7-23 [facility].

## 2023-07-06 DIAGNOSIS — E78.2 MIXED HYPERLIPIDEMIA: ICD-10-CM

## 2023-07-06 RX ORDER — PRAVASTATIN SODIUM 80 MG/1
80 TABLET ORAL NIGHTLY
Qty: 90 TABLET | Refills: 1 | Status: SHIPPED | OUTPATIENT
Start: 2023-07-06

## 2023-07-06 RX ORDER — ALBUTEROL SULFATE 90 UG/1
2 AEROSOL, METERED RESPIRATORY (INHALATION) EVERY 6 HOURS PRN
Qty: 18 G | Refills: 3 | Status: SHIPPED | OUTPATIENT
Start: 2023-07-06

## 2023-07-06 NOTE — TELEPHONE ENCOUNTER
Pt calling for refills to be sent to [facility] [phone] for :    Prevastatin  Albuteral inhaler    Last ov 2-21-23 [facility].  Next ov = jacob 8-7-23 [facility].

## 2023-07-06 NOTE — TELEPHONE ENCOUNTER
Medication:   Requested Prescriptions     Pending Prescriptions Disp Refills    pravastatin (PRAVACHOL) 80 MG tablet 90 tablet 1     Sig: Take 1 tablet by mouth nightly    albuterol sulfate HFA (PROAIR HFA) 108 (90 Base) MCG/ACT inhaler 18 g 3     Sig: Inhale 2 puffs into the lungs every 6 hours as needed for Wheezing        Last Filled:      Last appt: 2/21/2023   Next appt: 8/7/2023    Last OARRS: No flowsheet data found.

## 2023-07-17 RX ORDER — ALBUTEROL SULFATE 2.5 MG/3ML
SOLUTION RESPIRATORY (INHALATION)
Qty: 90 ML | Refills: 1 | Status: SHIPPED | OUTPATIENT
Start: 2023-07-17

## 2023-07-17 NOTE — TELEPHONE ENCOUNTER
Pt calling for refills on her nebulizer vials to be called in to [facility] [phone]    Last ov 2-21-23 [facility].  Next ov = jacob 8-7-23 [facility].

## 2023-07-29 DIAGNOSIS — I10 ESSENTIAL HYPERTENSION: ICD-10-CM

## 2023-07-31 DIAGNOSIS — E11.22 TYPE 2 DIABETES MELLITUS WITH STAGE 3 CHRONIC KIDNEY DISEASE, WITHOUT LONG-TERM CURRENT USE OF INSULIN, UNSPECIFIED WHETHER STAGE 3A OR 3B CKD (HCC): ICD-10-CM

## 2023-07-31 DIAGNOSIS — N18.30 TYPE 2 DIABETES MELLITUS WITH STAGE 3 CHRONIC KIDNEY DISEASE, WITHOUT LONG-TERM CURRENT USE OF INSULIN, UNSPECIFIED WHETHER STAGE 3A OR 3B CKD (HCC): ICD-10-CM

## 2023-07-31 LAB
ALBUMIN SERPL-MCNC: 4.5 G/DL (ref 3.4–5)
ALBUMIN/GLOB SERPL: 2.1 {RATIO} (ref 1.1–2.2)
ALP SERPL-CCNC: 99 U/L (ref 40–129)
ALT SERPL-CCNC: 10 U/L (ref 10–40)
ANION GAP SERPL CALCULATED.3IONS-SCNC: 12 MMOL/L (ref 3–16)
AST SERPL-CCNC: 18 U/L (ref 15–37)
BASOPHILS # BLD: 0 K/UL (ref 0–0.2)
BASOPHILS NFR BLD: 0.5 %
BILIRUB SERPL-MCNC: 0.6 MG/DL (ref 0–1)
BUN SERPL-MCNC: 9 MG/DL (ref 7–20)
CALCIUM SERPL-MCNC: 10.1 MG/DL (ref 8.3–10.6)
CHLORIDE SERPL-SCNC: 100 MMOL/L (ref 99–110)
CHOLEST SERPL-MCNC: 151 MG/DL (ref 0–199)
CO2 SERPL-SCNC: 24 MMOL/L (ref 21–32)
CREAT SERPL-MCNC: 1.5 MG/DL (ref 0.6–1.2)
DEPRECATED RDW RBC AUTO: 13.9 % (ref 12.4–15.4)
EOSINOPHIL # BLD: 0.2 K/UL (ref 0–0.6)
EOSINOPHIL NFR BLD: 1.9 %
GFR SERPLBLD CREATININE-BSD FMLA CKD-EPI: 36 ML/MIN/{1.73_M2}
GLUCOSE SERPL-MCNC: 148 MG/DL (ref 70–99)
HCT VFR BLD AUTO: 36.8 % (ref 36–48)
HDLC SERPL-MCNC: 50 MG/DL (ref 40–60)
HGB BLD-MCNC: 12.4 G/DL (ref 12–16)
LDL CHOLESTEROL CALCULATED: 82 MG/DL
LYMPHOCYTES # BLD: 3.3 K/UL (ref 1–5.1)
LYMPHOCYTES NFR BLD: 34 %
MCH RBC QN AUTO: 28 PG (ref 26–34)
MCHC RBC AUTO-ENTMCNC: 33.7 G/DL (ref 31–36)
MCV RBC AUTO: 83.3 FL (ref 80–100)
MONOCYTES # BLD: 0.7 K/UL (ref 0–1.3)
MONOCYTES NFR BLD: 7.3 %
NEUTROPHILS # BLD: 5.4 K/UL (ref 1.7–7.7)
NEUTROPHILS NFR BLD: 56.3 %
PLATELET # BLD AUTO: 341 K/UL (ref 135–450)
PMV BLD AUTO: 7.3 FL (ref 5–10.5)
POTASSIUM SERPL-SCNC: 5.1 MMOL/L (ref 3.5–5.1)
PROT SERPL-MCNC: 6.6 G/DL (ref 6.4–8.2)
RBC # BLD AUTO: 4.42 M/UL (ref 4–5.2)
SODIUM SERPL-SCNC: 136 MMOL/L (ref 136–145)
TRIGL SERPL-MCNC: 97 MG/DL (ref 0–150)
VLDLC SERPL CALC-MCNC: 19 MG/DL
WBC # BLD AUTO: 9.6 K/UL (ref 4–11)

## 2023-07-31 RX ORDER — METOPROLOL SUCCINATE 50 MG/1
TABLET, EXTENDED RELEASE ORAL
Qty: 90 TABLET | Refills: 1 | Status: SHIPPED | OUTPATIENT
Start: 2023-07-31

## 2023-07-31 NOTE — TELEPHONE ENCOUNTER
Medication:   Requested Prescriptions     Pending Prescriptions Disp Refills    metoprolol succinate (TOPROL XL) 50 MG extended release tablet [Pharmacy Med Name: Metoprolol Succinate ER 50 MG Oral Tablet Extended Release 24 Hour] 90 tablet 0     Sig: Take 1 tablet by mouth once daily        Last Filled:  2/13/23    Last appt: 2/21/2023   Next appt: 8/7/2023    Last OARRS: No flowsheet data found.

## 2023-08-07 ENCOUNTER — OFFICE VISIT (OUTPATIENT)
Dept: PRIMARY CARE CLINIC | Age: 78
End: 2023-08-07

## 2023-08-07 VITALS
BODY MASS INDEX: 22.92 KG/M2 | HEART RATE: 77 BPM | WEIGHT: 135.6 LBS | SYSTOLIC BLOOD PRESSURE: 133 MMHG | OXYGEN SATURATION: 97 % | DIASTOLIC BLOOD PRESSURE: 69 MMHG

## 2023-08-07 DIAGNOSIS — Z00.00 MEDICARE ANNUAL WELLNESS VISIT, SUBSEQUENT: Primary | ICD-10-CM

## 2023-08-07 DIAGNOSIS — E11.9 TYPE 2 DIABETES MELLITUS WITHOUT COMPLICATION, WITHOUT LONG-TERM CURRENT USE OF INSULIN (HCC): ICD-10-CM

## 2023-08-07 DIAGNOSIS — N18.30 STAGE 3 CHRONIC KIDNEY DISEASE, UNSPECIFIED WHETHER STAGE 3A OR 3B CKD (HCC): ICD-10-CM

## 2023-08-07 DIAGNOSIS — E26.9 HYPERALDOSTERONISM (HCC): ICD-10-CM

## 2023-08-07 DIAGNOSIS — I10 ESSENTIAL HYPERTENSION: ICD-10-CM

## 2023-08-07 DIAGNOSIS — J45.30 MILD PERSISTENT ASTHMA WITHOUT COMPLICATION: ICD-10-CM

## 2023-08-07 PROBLEM — N18.4 CKD (CHRONIC KIDNEY DISEASE) STAGE 4, GFR 15-29 ML/MIN (HCC): Status: RESOLVED | Noted: 2022-07-19 | Resolved: 2023-08-07

## 2023-08-07 LAB — HBA1C MFR BLD: 7.2 %

## 2023-08-07 RX ORDER — AMLODIPINE BESYLATE 2.5 MG/1
2.5 TABLET ORAL DAILY
Qty: 90 TABLET | Refills: 1 | Status: SHIPPED | OUTPATIENT
Start: 2023-08-07

## 2023-08-07 RX ORDER — GLIPIZIDE 2.5 MG/1
TABLET, EXTENDED RELEASE ORAL
Qty: 90 TABLET | Refills: 1 | Status: SHIPPED | OUTPATIENT
Start: 2023-08-07

## 2023-08-07 RX ORDER — LANCETS 33 GAUGE
EACH MISCELLANEOUS
Qty: 200 EACH | Refills: 1 | Status: SHIPPED | OUTPATIENT
Start: 2023-08-07

## 2023-08-07 RX ORDER — LANCETS 33 GAUGE
EACH MISCELLANEOUS
Qty: 300 EACH | Refills: 3 | Status: SHIPPED | OUTPATIENT
Start: 2023-08-07

## 2023-08-07 RX ORDER — BLOOD SUGAR DIAGNOSTIC
STRIP MISCELLANEOUS
Qty: 100 EACH | Refills: 3 | Status: SHIPPED | OUTPATIENT
Start: 2023-08-07

## 2023-08-07 RX ORDER — METOPROLOL SUCCINATE 50 MG/1
50 TABLET, EXTENDED RELEASE ORAL DAILY
Qty: 90 TABLET | Refills: 1 | Status: CANCELLED | OUTPATIENT
Start: 2023-08-07

## 2023-08-07 RX ORDER — SPIRONOLACTONE 25 MG/1
12.5 TABLET ORAL EVERY MORNING
Qty: 45 TABLET | Refills: 1 | Status: SHIPPED | OUTPATIENT
Start: 2023-08-07

## 2023-08-07 ASSESSMENT — PATIENT HEALTH QUESTIONNAIRE - PHQ9
SUM OF ALL RESPONSES TO PHQ QUESTIONS 1-9: 0
2. FEELING DOWN, DEPRESSED OR HOPELESS: 0
SUM OF ALL RESPONSES TO PHQ QUESTIONS 1-9: 0
1. LITTLE INTEREST OR PLEASURE IN DOING THINGS: 0
SUM OF ALL RESPONSES TO PHQ9 QUESTIONS 1 & 2: 0

## 2023-08-07 ASSESSMENT — LIFESTYLE VARIABLES
HOW OFTEN DO YOU HAVE A DRINK CONTAINING ALCOHOL: NEVER
HOW MANY STANDARD DRINKS CONTAINING ALCOHOL DO YOU HAVE ON A TYPICAL DAY: PATIENT DOES NOT DRINK

## 2023-08-07 NOTE — PROGRESS NOTES
EYE EVERY DAY AT BEDTIME  Historical Provider, MD   vitamin D 25 MCG (1000 UT) CAPS Take by mouth  Historical Provider, MD       [facility] (Including outside providers/suppliers regularly involved in providing care):   Patient Care Team:  Martinez Branch DO as PCP - General (Family Medicine)  Martinez Branch DO as PCP - Empaneled Provider     Reviewed and updated this visit:  Allergies  Problems

## 2023-08-07 NOTE — PATIENT INSTRUCTIONS
Personalized Preventive Plan for Navi Dukes - 8/7/2023  Medicare offers a range of preventive health benefits. Some of the tests and screenings are paid in full while other may be subject to a deductible, co-insurance, and/or copay. Some of these benefits include a comprehensive review of your medical history including lifestyle, illnesses that may run in your family, and various assessments and screenings as appropriate. After reviewing your medical record and screening and assessments performed today your provider may have ordered immunizations, labs, imaging, and/or referrals for you. A list of these orders (if applicable) as well as your Preventive Care list are included within your After Visit Summary for your review. Other Preventive Recommendations:    A preventive eye exam performed by an eye specialist is recommended every 1-2 years to screen for glaucoma; cataracts, macular degeneration, and other eye disorders. A preventive dental visit is recommended every 6 months. Try to get at least 150 minutes of exercise per week or 10,000 steps per day on a pedometer . Order or download the FREE \"Exercise & Physical Activity: Your Everyday Guide\" from [employer]. Call 2-463.186.2844 or search [employer] online. You need 3041-6764 mg of calcium and 8870-0082 IU of vitamin D per day. It is possible to meet your calcium requirement with diet alone, but a vitamin D supplement is usually necessary to meet this goal.  When exposed to the sun, use a sunscreen that protects against both UVA and UVB radiation with an SPF of 30 or greater. Reapply every 2 to 3 hours or after sweating, drying off with a towel, or swimming. Always wear a seat belt when traveling in a car. Always wear a helmet when riding a bicycle or motorcycle.

## 2023-08-16 DIAGNOSIS — E11.9 TYPE 2 DIABETES MELLITUS WITHOUT COMPLICATION, WITHOUT LONG-TERM CURRENT USE OF INSULIN (HCC): ICD-10-CM

## 2023-08-16 RX ORDER — BLOOD SUGAR DIAGNOSTIC
1 STRIP MISCELLANEOUS DAILY
Qty: 100 EACH | Refills: 3 | Status: SHIPPED | OUTPATIENT
Start: 2023-08-16

## 2023-08-16 NOTE — TELEPHONE ENCOUNTER
Pharmacy sent paperwork stating they need a more specified signature than as needed. Strips pended for signature change.

## 2023-08-30 ENCOUNTER — TELEPHONE (OUTPATIENT)
Dept: PRIMARY CARE CLINIC | Age: 78
End: 2023-08-30

## 2023-08-30 NOTE — TELEPHONE ENCOUNTER
Bob Ferrera from [facility] calling saying pt has  had multiple trips to the ER for breathing issues, gets treatments in the ER, and sent home    He is wanting to know if Dr would consider ordering a nebulizer and the med for the nebulizer    Please let him know

## 2023-08-31 NOTE — TELEPHONE ENCOUNTER
Patient has not been to the ER since 2020. Patient has not requested this themselves. Closing encounter.

## 2023-09-18 RX ORDER — ALBUTEROL SULFATE 90 UG/1
2 AEROSOL, METERED RESPIRATORY (INHALATION) EVERY 6 HOURS PRN
Qty: 18 G | Refills: 3 | Status: SHIPPED | OUTPATIENT
Start: 2023-09-18

## 2023-09-18 RX ORDER — ALBUTEROL SULFATE 2.5 MG/3ML
SOLUTION RESPIRATORY (INHALATION)
Qty: 90 ML | Refills: 1 | Status: SHIPPED | OUTPATIENT
Start: 2023-09-18

## 2023-09-18 NOTE — TELEPHONE ENCOUNTER
Medication:   Requested Prescriptions     Pending Prescriptions Disp Refills    albuterol (PROVENTIL) (2.5 MG/3ML) 0.083% nebulizer solution 90 mL 1     Sig: USE 1 VIAL IN NEBULIZER EVERY 6 HOURS AS NEEDED FOR WHEEZING,  DX J45.30, Asthma    albuterol sulfate HFA (PROAIR HFA) 108 (90 Base) MCG/ACT inhaler 18 g 3     Sig: Inhale 2 puffs into the lungs every 6 hours as needed for Wheezing        Last Filled:  7/17/23, 7/6/23    Last appt: 8/7/2023   Next appt: 2/7/2024    Last OARRS:        No data to display

## 2023-10-16 RX ORDER — SPIRONOLACTONE 25 MG/1
12.5 TABLET ORAL EVERY MORNING
Qty: 45 TABLET | Refills: 1 | Status: SHIPPED | OUTPATIENT
Start: 2023-10-16

## 2023-10-16 NOTE — TELEPHONE ENCOUNTER
Medication:   Requested Prescriptions     Pending Prescriptions Disp Refills    spironolactone (ALDACTONE) 25 MG tablet 45 tablet 1     Sig: Take 0.5 tablets by mouth every morning        Last Filled:      Patient Phone Number: [phone] (home)     Last appt: 8/7/2023   Next appt: 2/7/2024    Last OARRS:        No data to display

## 2023-12-11 RX ORDER — ALBUTEROL SULFATE 90 UG/1
2 AEROSOL, METERED RESPIRATORY (INHALATION) EVERY 6 HOURS PRN
Qty: 18 G | Refills: 3 | Status: SHIPPED | OUTPATIENT
Start: 2023-12-11

## 2023-12-11 RX ORDER — LANCETS 33 GAUGE
EACH MISCELLANEOUS
Qty: 200 EACH | Refills: 1 | Status: SHIPPED | OUTPATIENT
Start: 2023-12-11

## 2023-12-11 RX ORDER — ALBUTEROL SULFATE 2.5 MG/3ML
SOLUTION RESPIRATORY (INHALATION)
Qty: 90 ML | Refills: 1 | Status: SHIPPED | OUTPATIENT
Start: 2023-12-11

## 2023-12-11 NOTE — TELEPHONE ENCOUNTER
Medication:   Requested Prescriptions     Pending Prescriptions Disp Refills    OneTouch Delica Lancets 55B MISC 200 each 1     Sig: Check glucose twice daily, DX E11.9    albuterol (PROVENTIL) (2.5 MG/3ML) 0.083% nebulizer solution 90 mL 1     Sig: USE 1 VIAL IN NEBULIZER EVERY 6 HOURS AS NEEDED FOR WHEEZING,  DX J45.30, Asthma    albuterol sulfate HFA (PROAIR HFA) 108 (90 Base) MCG/ACT inhaler 18 g 3     Sig: Inhale 2 puffs into the lungs every 6 hours as needed for Wheezing       Last appt: 8/7/2023   Next appt: 2/7/2024

## 2024-01-16 DIAGNOSIS — E78.2 MIXED HYPERLIPIDEMIA: ICD-10-CM

## 2024-01-16 RX ORDER — ALBUTEROL SULFATE 2.5 MG/3ML
SOLUTION RESPIRATORY (INHALATION)
Qty: 180 ML | Refills: 0 | Status: SHIPPED | OUTPATIENT
Start: 2024-01-16

## 2024-01-16 RX ORDER — PRAVASTATIN SODIUM 80 MG/1
80 TABLET ORAL NIGHTLY
Qty: 90 TABLET | Refills: 1 | Status: SHIPPED | OUTPATIENT
Start: 2024-01-16

## 2024-01-16 RX ORDER — ALBUTEROL SULFATE 90 UG/1
2 AEROSOL, METERED RESPIRATORY (INHALATION) EVERY 6 HOURS PRN
Qty: 18 G | Refills: 3 | Status: SHIPPED | OUTPATIENT
Start: 2024-01-16

## 2024-01-16 NOTE — TELEPHONE ENCOUNTER
Medication:   Requested Prescriptions     Pending Prescriptions Disp Refills    albuterol (PROVENTIL) (2.5 MG/3ML) 0.083% nebulizer solution [Pharmacy Med Name: Albuterol Sulfate (2.5 MG/3ML) 0.083% Inhalation Nebulization Solution] 180 mL 0     Sig: USE 1 VIAL IN NEBULIZER EVERY 6 HOURS AS NEEDED FOR WHEEZING (ASTHMA)     Last Filled:  n/a    Last appt: 8/7/2023   Next appt: 2/19/2024    Pt called stating that she needs a refill on nebulizer solution.

## 2024-01-16 NOTE — TELEPHONE ENCOUNTER
Medication:   Requested Prescriptions     Pending Prescriptions Disp Refills    pravastatin (PRAVACHOL) 80 MG tablet 90 tablet 1     Sig: Take 1 tablet by mouth nightly    albuterol sulfate HFA (PROAIR HFA) 108 (90 Base) MCG/ACT inhaler 18 g 3     Sig: Inhale 2 puffs into the lungs every 6 hours as needed for Wheezing     Last appt: 8/7/2023   Next appt: 2/19/2024

## 2024-01-23 DIAGNOSIS — I10 ESSENTIAL HYPERTENSION: ICD-10-CM

## 2024-01-23 RX ORDER — GLIPIZIDE 2.5 MG/1
TABLET, EXTENDED RELEASE ORAL
Qty: 90 TABLET | Refills: 1 | Status: SHIPPED | OUTPATIENT
Start: 2024-01-23

## 2024-01-23 RX ORDER — AMLODIPINE BESYLATE 2.5 MG/1
2.5 TABLET ORAL DAILY
Qty: 90 TABLET | Refills: 1 | Status: SHIPPED | OUTPATIENT
Start: 2024-01-23

## 2024-01-23 RX ORDER — METOPROLOL SUCCINATE 50 MG/1
50 TABLET, EXTENDED RELEASE ORAL DAILY
Qty: 90 TABLET | Refills: 1 | Status: SHIPPED | OUTPATIENT
Start: 2024-01-23

## 2024-01-23 RX ORDER — SPIRONOLACTONE 25 MG/1
12.5 TABLET ORAL EVERY MORNING
Qty: 45 TABLET | Refills: 1 | Status: SHIPPED | OUTPATIENT
Start: 2024-01-23

## 2024-01-23 NOTE — TELEPHONE ENCOUNTER
Medication:   Requested Prescriptions     Pending Prescriptions Disp Refills    glipiZIDE (GLUCOTROL XL) 2.5 MG extended release tablet 90 tablet 1     Sig: Take 1 tablet by mouth once daily    amLODIPine (NORVASC) 2.5 MG tablet 90 tablet 1     Sig: Take 1 tablet by mouth daily    metoprolol succinate (TOPROL XL) 50 MG extended release tablet 90 tablet 1     Sig: Take 1 tablet by mouth daily    spironolactone (ALDACTONE) 25 MG tablet 45 tablet 1     Sig: Take 0.5 tablets by mouth every morning       Last appt: 8/7/2023   Next appt: 2/19/2024

## 2024-04-03 DIAGNOSIS — E11.9 TYPE 2 DIABETES MELLITUS WITHOUT COMPLICATION, WITHOUT LONG-TERM CURRENT USE OF INSULIN (HCC): ICD-10-CM

## 2024-04-03 RX ORDER — ALBUTEROL SULFATE 2.5 MG/3ML
SOLUTION RESPIRATORY (INHALATION)
Qty: 180 ML | Refills: 0 | Status: SHIPPED | OUTPATIENT
Start: 2024-04-03

## 2024-04-03 RX ORDER — BLOOD SUGAR DIAGNOSTIC
1 STRIP MISCELLANEOUS DAILY
Qty: 100 EACH | Refills: 3 | Status: SHIPPED | OUTPATIENT
Start: 2024-04-03

## 2024-04-03 NOTE — TELEPHONE ENCOUNTER
Medication:   Requested Prescriptions     Pending Prescriptions Disp Refills    blood glucose test strips (ONETOUCH ULTRA) strip 100 each 3     Si each by Other route daily    albuterol (PROVENTIL) (2.5 MG/3ML) 0.083% nebulizer solution 180 mL 0     Sig: USE 1 VIAL IN NEBULIZER EVERY 6 HOURS AS NEEDED FOR WHEEZING (ASTHMA)         Last appt: 2023   Next appt: 2024

## 2024-04-08 ENCOUNTER — TELEPHONE (OUTPATIENT)
Dept: ADMINISTRATIVE | Age: 79
End: 2024-04-08

## 2024-04-08 NOTE — TELEPHONE ENCOUNTER
PA request for Albuterol Sulfate (2.5 MG/3ML)0.083% nebulizer solution was reversed and archived by [facility].  Called [facility] and spoke to the pharmacist who said that the medication was already picked up by the patient.

## 2024-04-09 DIAGNOSIS — I10 ESSENTIAL HYPERTENSION: ICD-10-CM

## 2024-04-09 NOTE — TELEPHONE ENCOUNTER
Medication:   Requested Prescriptions     Pending Prescriptions Disp Refills    amLODIPine (NORVASC) 2.5 MG tablet 90 tablet 1     Sig: Take 1 tablet by mouth daily    albuterol sulfate HFA (PROAIR HFA) 108 (90 Base) MCG/ACT inhaler 18 g 3     Sig: Inhale 2 puffs into the lungs every 6 hours as needed for Wheezing         Last appt: 8/7/2023   Next appt: 4/30/2024

## 2024-04-10 RX ORDER — AMLODIPINE BESYLATE 2.5 MG/1
2.5 TABLET ORAL DAILY
Qty: 90 TABLET | Refills: 1 | Status: SHIPPED | OUTPATIENT
Start: 2024-04-10

## 2024-04-10 RX ORDER — ALBUTEROL SULFATE 90 UG/1
2 AEROSOL, METERED RESPIRATORY (INHALATION) EVERY 6 HOURS PRN
Qty: 18 G | Refills: 3 | Status: SHIPPED | OUTPATIENT
Start: 2024-04-10

## 2024-04-30 ENCOUNTER — OFFICE VISIT (OUTPATIENT)
Dept: PRIMARY CARE CLINIC | Age: 79
End: 2024-04-30

## 2024-04-30 VITALS
WEIGHT: 130.4 LBS | OXYGEN SATURATION: 97 % | HEART RATE: 72 BPM | DIASTOLIC BLOOD PRESSURE: 69 MMHG | BODY MASS INDEX: 22.04 KG/M2 | SYSTOLIC BLOOD PRESSURE: 136 MMHG | TEMPERATURE: 97.9 F

## 2024-04-30 DIAGNOSIS — J45.40 MODERATE PERSISTENT ASTHMA WITHOUT COMPLICATION: Primary | ICD-10-CM

## 2024-04-30 DIAGNOSIS — N18.32 CHRONIC KIDNEY DISEASE, STAGE 3B (HCC): ICD-10-CM

## 2024-04-30 DIAGNOSIS — E78.2 MIXED HYPERLIPIDEMIA: ICD-10-CM

## 2024-04-30 DIAGNOSIS — N18.30 TYPE 2 DIABETES MELLITUS WITH STAGE 3 CHRONIC KIDNEY DISEASE, WITHOUT LONG-TERM CURRENT USE OF INSULIN, UNSPECIFIED WHETHER STAGE 3A OR 3B CKD (HCC): ICD-10-CM

## 2024-04-30 DIAGNOSIS — E26.9 HYPERALDOSTERONISM (HCC): ICD-10-CM

## 2024-04-30 DIAGNOSIS — I10 ESSENTIAL HYPERTENSION: ICD-10-CM

## 2024-04-30 DIAGNOSIS — E11.22 TYPE 2 DIABETES MELLITUS WITH STAGE 3 CHRONIC KIDNEY DISEASE, WITHOUT LONG-TERM CURRENT USE OF INSULIN, UNSPECIFIED WHETHER STAGE 3A OR 3B CKD (HCC): ICD-10-CM

## 2024-04-30 DIAGNOSIS — N18.30 STAGE 3 CHRONIC KIDNEY DISEASE, UNSPECIFIED WHETHER STAGE 3A OR 3B CKD (HCC): ICD-10-CM

## 2024-04-30 LAB — HBA1C MFR BLD: 7.2 %

## 2024-04-30 RX ORDER — BUDESONIDE 0.25 MG/2ML
250 INHALANT ORAL 2 TIMES DAILY
Qty: 60 EACH | Refills: 3 | Status: SHIPPED | OUTPATIENT
Start: 2024-04-30

## 2024-04-30 RX ORDER — ALBUTEROL SULFATE 90 UG/1
2 AEROSOL, METERED RESPIRATORY (INHALATION) EVERY 6 HOURS PRN
Qty: 18 G | Refills: 3 | Status: SHIPPED | OUTPATIENT
Start: 2024-04-30

## 2024-04-30 RX ORDER — PRAVASTATIN SODIUM 80 MG/1
80 TABLET ORAL NIGHTLY
Qty: 90 TABLET | Refills: 1 | Status: SHIPPED | OUTPATIENT
Start: 2024-04-30

## 2024-04-30 RX ORDER — METOPROLOL SUCCINATE 50 MG/1
50 TABLET, EXTENDED RELEASE ORAL DAILY
Qty: 90 TABLET | Refills: 1 | Status: SHIPPED | OUTPATIENT
Start: 2024-04-30

## 2024-04-30 RX ORDER — SPIRONOLACTONE 25 MG/1
12.5 TABLET ORAL EVERY MORNING
Qty: 45 TABLET | Refills: 1 | Status: SHIPPED | OUTPATIENT
Start: 2024-04-30

## 2024-04-30 RX ORDER — ALBUTEROL SULFATE 2.5 MG/3ML
SOLUTION RESPIRATORY (INHALATION)
Qty: 180 ML | Refills: 0 | Status: SHIPPED | OUTPATIENT
Start: 2024-04-30 | End: 2024-05-02 | Stop reason: SDUPTHER

## 2024-04-30 RX ORDER — AMLODIPINE BESYLATE 2.5 MG/1
2.5 TABLET ORAL DAILY
Qty: 90 TABLET | Refills: 1 | Status: SHIPPED | OUTPATIENT
Start: 2024-04-30

## 2024-04-30 RX ORDER — GLIPIZIDE 2.5 MG/1
TABLET, EXTENDED RELEASE ORAL
Qty: 90 TABLET | Refills: 1 | Status: SHIPPED | OUTPATIENT
Start: 2024-04-30

## 2024-04-30 SDOH — ECONOMIC STABILITY: FOOD INSECURITY: WITHIN THE PAST 12 MONTHS, THE FOOD YOU BOUGHT JUST DIDN'T LAST AND YOU DIDN'T HAVE MONEY TO GET MORE.: NEVER TRUE

## 2024-04-30 SDOH — ECONOMIC STABILITY: FOOD INSECURITY: WITHIN THE PAST 12 MONTHS, YOU WORRIED THAT YOUR FOOD WOULD RUN OUT BEFORE YOU GOT MONEY TO BUY MORE.: NEVER TRUE

## 2024-04-30 SDOH — ECONOMIC STABILITY: INCOME INSECURITY: HOW HARD IS IT FOR YOU TO PAY FOR THE VERY BASICS LIKE FOOD, HOUSING, MEDICAL CARE, AND HEATING?: NOT HARD AT ALL

## 2024-04-30 ASSESSMENT — PATIENT HEALTH QUESTIONNAIRE - PHQ9
SUM OF ALL RESPONSES TO PHQ QUESTIONS 1-9: 0
2. FEELING DOWN, DEPRESSED OR HOPELESS: NOT AT ALL
SUM OF ALL RESPONSES TO PHQ QUESTIONS 1-9: 0

## 2024-04-30 NOTE — PROGRESS NOTES
[facility] PHYSICIAN PRACTICES  [facility] PRIMARY CARE  [address] [place] [address]  Dept: [phone]  Dept Fax: [phone]     4/30/2024      Carla Galan   1945     Chief Complaint   Patient presents with    Follow-up       HPI    Pt comes in today for routine follow up. H/o HTN/T2DM/asthma/CKDIII.     Notes she just returned from [place] for the summer. Asthma has not been well controlled. Using her albuterol inhaler and nebulizer daily. Feels she does this partly out of habit. She has previously not wanted to try an inhaled steroid, but is open now. Cost of her medications has been high as well. Lives in a shelter community. Has had a hard time navigating her insurance coverage with regards to her meds.     DMII - A1c 7.2%. Notes her diet is always off while in [place].     PHQ-9 Total Score: 0 (4/30/2024 10:39 AM)       Prior to Visit Medications    Medication Sig Taking? Authorizing Provider   amLODIPine (NORVASC) 2.5 MG tablet Take 1 tablet by mouth daily  Marjorie Bond DO   albuterol sulfate HFA (PROAIR HFA) 108 (90 Base) MCG/ACT inhaler Inhale 2 puffs into the lungs every 6 hours as needed for Wheezing  Marjorie Bond DO   blood glucose test strips (ONETOUCH ULTRA) strip 1 each by Other route daily  Marjorie Bond DO   albuterol (PROVENTIL) (2.5 MG/3ML) 0.083% nebulizer solution USE 1 VIAL IN NEBULIZER EVERY 6 HOURS AS NEEDED FOR WHEEZING (ASTHMA)  Marjorie Bond DO   glipiZIDE (GLUCOTROL XL) 2.5 MG extended release tablet Take 1 tablet by mouth once daily  Marjorie Bond DO   metoprolol succinate (TOPROL XL) 50 MG extended release tablet Take 1 tablet by mouth daily  Marjorie Bond DO   spironolactone (ALDACTONE) 25 MG tablet Take 0.5 tablets by mouth every morning  Marjorie Bond DO   pravastatin (PRAVACHOL) 80 MG tablet Take 1 tablet by mouth nightly  Marjorie Bond DO

## 2024-05-01 ENCOUNTER — TELEPHONE (OUTPATIENT)
Dept: ADMINISTRATIVE | Age: 79
End: 2024-05-01

## 2024-05-01 ASSESSMENT — ENCOUNTER SYMPTOMS
VOMITING: 0
SHORTNESS OF BREATH: 0
WHEEZING: 1
DIARRHEA: 0
ABDOMINAL PAIN: 0
NAUSEA: 0

## 2024-05-01 NOTE — TELEPHONE ENCOUNTER
Submitted PA for Albuterol Sulfate (2.5 MG/3ML)0.083% nebulizer solution  Via CMM Key: [id number]  STATUS: PENDING.    Follow up done daily; if no decision with in three days we will refax.  If another three days goes by with no decision will call the insurance for status.

## 2024-05-02 ENCOUNTER — TELEPHONE (OUTPATIENT)
Dept: PRIMARY CARE CLINIC | Age: 79
End: 2024-05-02

## 2024-05-02 DIAGNOSIS — J45.40 MODERATE PERSISTENT ASTHMA WITHOUT COMPLICATION: ICD-10-CM

## 2024-05-02 RX ORDER — ALBUTEROL SULFATE 2.5 MG/3ML
SOLUTION RESPIRATORY (INHALATION)
Qty: 180 ML | Refills: 0 | Status: SHIPPED | OUTPATIENT
Start: 2024-05-02

## 2024-05-02 NOTE — TELEPHONE ENCOUNTER
DENIAL for Albuterol Sulfate (2.5 MG/3ML)0.083% nebulizer solution under Medicare Part D because the requested drug will be used in a nebulizer in your home.  Medicare Part B covers the requested drug when used in the home with a nebulizer.  Letter attached.    .Please advise the pharmacy.  Thank you!

## 2024-05-03 ENCOUNTER — TELEPHONE (OUTPATIENT)
Dept: PRIMARY CARE CLINIC | Age: 79
End: 2024-05-03

## 2024-05-03 NOTE — TELEPHONE ENCOUNTER
----- Message from Marjorie Bond, DO sent at 5/1/2024 10:42 AM EDT -----  Let her know I did send a referral to [facility] so they will call her to schedule in the next week or so.

## 2024-05-14 NOTE — TELEPHONE ENCOUNTER
Received PA request for Budesonide 0.25MG/2ml nebulizer suspension.  Since Albuterol Sulfate nebulizer solution isn't covered under Medicare Part D, then I wouldn't think the Budesonide solution would be covered under Part D either.  Medicare Part B should cover it.

## 2024-05-20 ENCOUNTER — TELEPHONE (OUTPATIENT)
Dept: PHARMACY | Age: 79
End: 2024-05-20

## 2024-05-20 NOTE — TELEPHONE ENCOUNTER
Pt LVM to r/s appt for COPD/asthma tomorrow. Called pt back.  Pt WCB to r/s.     Ghazala Rockwell, PharmD, BCACP  [facility]   [facility] Ph: [phone]  [facility] Ph: [phone]  5/20/2024 1:26 PM

## 2024-05-27 DIAGNOSIS — E78.2 MIXED HYPERLIPIDEMIA: ICD-10-CM

## 2024-05-28 RX ORDER — PRAVASTATIN SODIUM 80 MG/1
80 TABLET ORAL NIGHTLY
Qty: 90 TABLET | Refills: 0 | Status: SHIPPED | OUTPATIENT
Start: 2024-05-28

## 2024-06-28 NOTE — TELEPHONE ENCOUNTER
Medication:   Requested Prescriptions     Pending Prescriptions Disp Refills    Lancets (ONETOUCH DELICA PLUS [id number]) MISC 300 each 3     Sig: USE 1 LANCET TO CHECK GLUCOSE TWICE DAILY  DX  E11.9     Last appt: 4/30/2024   Next appt: 8/30/2024

## 2024-06-30 RX ORDER — LANCETS 33 GAUGE
EACH MISCELLANEOUS
Qty: 300 EACH | Refills: 3 | Status: SHIPPED | OUTPATIENT
Start: 2024-06-30

## 2024-07-01 NOTE — TELEPHONE ENCOUNTER
LAST SEEN       NEXT APPOINTMENT       LAST FILL                                                      4-30-24 8-30-24 11-30-22

## 2024-07-02 RX ORDER — LANCETS 33 GAUGE
EACH MISCELLANEOUS
Qty: 300 EACH | Refills: 0 | OUTPATIENT
Start: 2024-07-02

## 2024-07-24 ENCOUNTER — TELEPHONE (OUTPATIENT)
Dept: PHARMACY | Age: 79
End: 2024-07-24

## 2024-07-24 NOTE — TELEPHONE ENCOUNTER
----- Message from Marjorie Bond, DO sent at 4/30/2024 11:04 AM EDT -----  Do you see patients for asthma, not COPD? This lady has been struggling with her asthma. She lives in [place] half of the year and when she is here she lives alone. She is very nervous to escalate her medication regimen as she has become very dependent on just using the albuterol inhaler and nebulizer multiple times a day.... Her med coverage isn't great and she already does a nebulizer treatment twice a day, so she is open to switching that to pulmicort. But she would like to meet with you to do some further education and treatment if possible. Let me know!

## 2024-07-24 NOTE — TELEPHONE ENCOUNTER
See telephone encounter from 5/20/24.     Ghazala Rockwell, PharmD, BCACP  [facility]   [facility] Ph: [phone]  [facility] Ph: [phone]  7/24/2024 10:19 AM

## 2024-08-05 DIAGNOSIS — E26.9 HYPERALDOSTERONISM (HCC): ICD-10-CM

## 2024-08-05 RX ORDER — SPIRONOLACTONE 25 MG/1
12.5 TABLET ORAL EVERY MORNING
Qty: 45 TABLET | Refills: 1 | Status: SHIPPED | OUTPATIENT
Start: 2024-08-05

## 2024-08-05 NOTE — TELEPHONE ENCOUNTER
Medication:   Requested Prescriptions     Pending Prescriptions Disp Refills    spironolactone (ALDACTONE) 25 MG tablet 45 tablet 1     Sig: Take 0.5 tablets by mouth every morning     Last appt: 4/30/2024   Next appt: 8/30/2024

## 2024-08-09 DIAGNOSIS — J45.40 MODERATE PERSISTENT ASTHMA WITHOUT COMPLICATION: ICD-10-CM

## 2024-08-09 RX ORDER — ALBUTEROL SULFATE 90 UG/1
2 AEROSOL, METERED RESPIRATORY (INHALATION) EVERY 6 HOURS PRN
Qty: 18 G | Refills: 0 | Status: SHIPPED | OUTPATIENT
Start: 2024-08-09

## 2024-08-09 NOTE — TELEPHONE ENCOUNTER
Medication:   Requested Prescriptions     Pending Prescriptions Disp Refills    albuterol sulfate HFA (PROAIR HFA) 108 (90 Base) MCG/ACT inhaler 18 g 3     Sig: Inhale 2 puffs into the lungs every 6 hours as needed for Wheezing     Last Filled:  4/30/2024    Last appt: 4/30/2024   Next appt: 8/30/2024    **Patient calling today stating that she dropped her inhaler and it broke. Patient requesting a new one be sent in today**

## 2024-08-15 DIAGNOSIS — J45.40 MODERATE PERSISTENT ASTHMA WITHOUT COMPLICATION: ICD-10-CM

## 2024-08-15 RX ORDER — ALBUTEROL SULFATE 2.5 MG/3ML
SOLUTION RESPIRATORY (INHALATION)
Qty: 180 ML | Refills: 0 | Status: SHIPPED | OUTPATIENT
Start: 2024-08-15

## 2024-08-15 NOTE — TELEPHONE ENCOUNTER
Pt called in to request a refill of her nebulizer vials. Pt wants them sent to [facility]. \[facility] Outlook.\""

## 2024-08-30 ENCOUNTER — OFFICE VISIT (OUTPATIENT)
Dept: PRIMARY CARE CLINIC | Age: 79
End: 2024-08-30

## 2024-08-30 VITALS
SYSTOLIC BLOOD PRESSURE: 128 MMHG | HEART RATE: 59 BPM | WEIGHT: 133 LBS | DIASTOLIC BLOOD PRESSURE: 72 MMHG | HEIGHT: 65 IN | OXYGEN SATURATION: 97 % | BODY MASS INDEX: 22.16 KG/M2

## 2024-08-30 DIAGNOSIS — Z00.00 MEDICARE ANNUAL WELLNESS VISIT, SUBSEQUENT: Primary | ICD-10-CM

## 2024-08-30 DIAGNOSIS — E11.22 TYPE 2 DIABETES MELLITUS WITH STAGE 3 CHRONIC KIDNEY DISEASE, WITHOUT LONG-TERM CURRENT USE OF INSULIN, UNSPECIFIED WHETHER STAGE 3A OR 3B CKD (HCC): ICD-10-CM

## 2024-08-30 DIAGNOSIS — E26.9 HYPERALDOSTERONISM (HCC): ICD-10-CM

## 2024-08-30 DIAGNOSIS — N18.30 TYPE 2 DIABETES MELLITUS WITH STAGE 3 CHRONIC KIDNEY DISEASE, WITHOUT LONG-TERM CURRENT USE OF INSULIN, UNSPECIFIED WHETHER STAGE 3A OR 3B CKD (HCC): ICD-10-CM

## 2024-08-30 RX ORDER — SPIRONOLACTONE 25 MG/1
25 TABLET ORAL EVERY MORNING
Qty: 90 TABLET | Refills: 1 | Status: SHIPPED | OUTPATIENT
Start: 2024-08-30

## 2024-08-30 RX ORDER — BLOOD SUGAR DIAGNOSTIC
STRIP MISCELLANEOUS
Qty: 100 EACH | Refills: 3 | Status: SHIPPED | OUTPATIENT
Start: 2024-08-30

## 2024-08-30 SDOH — HEALTH STABILITY: PHYSICAL HEALTH: ON AVERAGE, HOW MANY DAYS PER WEEK DO YOU ENGAGE IN MODERATE TO STRENUOUS EXERCISE (LIKE A BRISK WALK)?: 7 DAYS

## 2024-08-30 SDOH — HEALTH STABILITY: PHYSICAL HEALTH: ON AVERAGE, HOW MANY MINUTES DO YOU ENGAGE IN EXERCISE AT THIS LEVEL?: 10 MIN

## 2024-08-30 ASSESSMENT — PATIENT HEALTH QUESTIONNAIRE - PHQ9
SUM OF ALL RESPONSES TO PHQ QUESTIONS 1-9: 0
2. FEELING DOWN, DEPRESSED OR HOPELESS: NOT AT ALL
1. LITTLE INTEREST OR PLEASURE IN DOING THINGS: NOT AT ALL
SUM OF ALL RESPONSES TO PHQ QUESTIONS 1-9: 0
SUM OF ALL RESPONSES TO PHQ9 QUESTIONS 1 & 2: 0

## 2024-08-30 ASSESSMENT — LIFESTYLE VARIABLES
HOW OFTEN DO YOU HAVE A DRINK CONTAINING ALCOHOL: 1
HOW MANY STANDARD DRINKS CONTAINING ALCOHOL DO YOU HAVE ON A TYPICAL DAY: 0
HOW OFTEN DO YOU HAVE SIX OR MORE DRINKS ON ONE OCCASION: 1
HOW MANY STANDARD DRINKS CONTAINING ALCOHOL DO YOU HAVE ON A TYPICAL DAY: PATIENT DOES NOT DRINK
HOW OFTEN DO YOU HAVE A DRINK CONTAINING ALCOHOL: NEVER

## 2024-08-30 NOTE — PROGRESS NOTES
Medicare Annual Wellness Visit    Carla Galan is here for Medicare AWV    Assessment & Plan   Medicare annual wellness visit, subsequent  Type 2 diabetes mellitus with stage 3 chronic kidney disease, without long-term current use of insulin, unspecified whether stage 3a or 3b CKD (HCC)  -     Comprehensive Metabolic Panel; Future  -     Lipid, Fasting; Future  -     CBC with Auto Differential; Future  -     Hemoglobin A1C; Future  -     Microalbumin / Creatinine Urine Ratio; Future  -     blood glucose test strips (ONETOUCH ULTRA) strip; Use to check blood sugar 1-2 times a day as needed, Disp-100 each, R-3Normal  Hyperaldosteronism (HCC)  -     spironolactone (ALDACTONE) 25 MG tablet; Take 1 tablet by mouth every morning, Disp-90 tablet, R-1Normal    Recommendations for Preventive Services Due: see orders and patient instructions/AVS.  Recommended screening schedule for the next 5-10 years is provided to the patient in written form: see Patient Instructions/AVS.     Return in 6 months (on 2/28/2025) for Routine follow up.     Subjective     Has had a few issues with the pharmacy. Pharmacist told her she has to pay out of pocket for test strips. Does not want to get a new meter at this time.     Leaving to head back to [place] in a few months.     Not using the pulmicort.     Patient's complete Health Risk Assessment and screening values have been reviewed and are found in Flowsheets. The following problems were reviewed today and where indicated follow up appointments were made and/or referrals ordered.    Positive Risk Factor Screenings with Interventions:                   Dentist Screen:  Have you seen the dentist within the past year?: (!) No    Intervention:  Advised to schedule with their dentist        Advanced Directives:  Do you have a Living Will?: (!) No    Intervention:  has NO advanced directive - information provided                 Objective   Vitals:    08/30/24 0939   BP: 128/72   Site: Left

## 2024-09-03 DIAGNOSIS — J45.40 MODERATE PERSISTENT ASTHMA WITHOUT COMPLICATION: ICD-10-CM

## 2024-09-03 RX ORDER — ALBUTEROL SULFATE 90 UG/1
2 AEROSOL, METERED RESPIRATORY (INHALATION) EVERY 6 HOURS PRN
Qty: 18 G | Refills: 5 | Status: SHIPPED | OUTPATIENT
Start: 2024-09-03

## 2024-09-03 NOTE — TELEPHONE ENCOUNTER
Patient called to request refill on her albuterol. Send to [facility]   LAST SEEN       NEXT APPOINTMENT            8.30.24   2.28.25

## 2024-09-13 DIAGNOSIS — N18.30 TYPE 2 DIABETES MELLITUS WITH STAGE 3 CHRONIC KIDNEY DISEASE, WITHOUT LONG-TERM CURRENT USE OF INSULIN, UNSPECIFIED WHETHER STAGE 3A OR 3B CKD (HCC): ICD-10-CM

## 2024-09-13 DIAGNOSIS — E11.22 TYPE 2 DIABETES MELLITUS WITH STAGE 3 CHRONIC KIDNEY DISEASE, WITHOUT LONG-TERM CURRENT USE OF INSULIN, UNSPECIFIED WHETHER STAGE 3A OR 3B CKD (HCC): ICD-10-CM

## 2024-09-13 LAB
ALBUMIN SERPL-MCNC: 4.3 G/DL (ref 3.4–5)
ALBUMIN/GLOB SERPL: 2.2 {RATIO} (ref 1.1–2.2)
ALP SERPL-CCNC: 107 U/L (ref 40–129)
ALT SERPL-CCNC: 15 U/L (ref 10–40)
ANION GAP SERPL CALCULATED.3IONS-SCNC: 11 MMOL/L (ref 3–16)
AST SERPL-CCNC: 25 U/L (ref 15–37)
BASOPHILS # BLD: 0.1 K/UL (ref 0–0.2)
BASOPHILS NFR BLD: 0.7 %
BILIRUB SERPL-MCNC: 0.6 MG/DL (ref 0–1)
BUN SERPL-MCNC: 14 MG/DL (ref 7–20)
CALCIUM SERPL-MCNC: 9.9 MG/DL (ref 8.3–10.6)
CHLORIDE SERPL-SCNC: 100 MMOL/L (ref 99–110)
CHOLEST SERPL-MCNC: 166 MG/DL (ref 0–199)
CO2 SERPL-SCNC: 24 MMOL/L (ref 21–32)
CREAT SERPL-MCNC: 1.5 MG/DL (ref 0.6–1.2)
CREAT UR-MCNC: 177 MG/DL (ref 28–259)
DEPRECATED RDW RBC AUTO: 13.5 % (ref 12.4–15.4)
EOSINOPHIL # BLD: 0.3 K/UL (ref 0–0.6)
EOSINOPHIL NFR BLD: 2.7 %
GFR SERPLBLD CREATININE-BSD FMLA CKD-EPI: 36 ML/MIN/{1.73_M2}
GLUCOSE SERPL-MCNC: 137 MG/DL (ref 70–99)
HCT VFR BLD AUTO: 37.4 % (ref 36–48)
HDLC SERPL-MCNC: 52 MG/DL (ref 40–60)
HGB BLD-MCNC: 12.3 G/DL (ref 12–16)
LDL CHOLESTEROL: 96 MG/DL
LYMPHOCYTES # BLD: 3.5 K/UL (ref 1–5.1)
LYMPHOCYTES NFR BLD: 34.7 %
MCH RBC QN AUTO: 27.5 PG (ref 26–34)
MCHC RBC AUTO-ENTMCNC: 32.9 G/DL (ref 31–36)
MCV RBC AUTO: 83.5 FL (ref 80–100)
MICROALBUMIN UR DL<=1MG/L-MCNC: <1.2 MG/DL
MICROALBUMIN/CREAT UR: NORMAL MG/G (ref 0–30)
MONOCYTES # BLD: 0.8 K/UL (ref 0–1.3)
MONOCYTES NFR BLD: 7.7 %
NEUTROPHILS # BLD: 5.5 K/UL (ref 1.7–7.7)
NEUTROPHILS NFR BLD: 54.2 %
PLATELET # BLD AUTO: 373 K/UL (ref 135–450)
PMV BLD AUTO: 7.3 FL (ref 5–10.5)
POTASSIUM SERPL-SCNC: 5.2 MMOL/L (ref 3.5–5.1)
PROT SERPL-MCNC: 6.3 G/DL (ref 6.4–8.2)
RBC # BLD AUTO: 4.48 M/UL (ref 4–5.2)
SODIUM SERPL-SCNC: 135 MMOL/L (ref 136–145)
TRIGL SERPL-MCNC: 90 MG/DL (ref 0–150)
VLDLC SERPL CALC-MCNC: 18 MG/DL
WBC # BLD AUTO: 10.1 K/UL (ref 4–11)

## 2024-09-14 LAB
EST. AVERAGE GLUCOSE BLD GHB EST-MCNC: 148.5 MG/DL
HBA1C MFR BLD: 6.8 %

## 2024-10-08 DIAGNOSIS — J45.40 MODERATE PERSISTENT ASTHMA WITHOUT COMPLICATION: ICD-10-CM

## 2024-10-08 RX ORDER — ALBUTEROL SULFATE 0.83 MG/ML
SOLUTION RESPIRATORY (INHALATION)
Qty: 180 ML | Refills: 0 | Status: SHIPPED | OUTPATIENT
Start: 2024-10-08

## 2024-10-08 RX ORDER — ALBUTEROL SULFATE 90 UG/1
2 INHALANT RESPIRATORY (INHALATION) EVERY 6 HOURS PRN
Qty: 18 G | Refills: 5 | OUTPATIENT
Start: 2024-10-08

## 2024-10-08 NOTE — TELEPHONE ENCOUNTER
Medication:   Requested Prescriptions     Pending Prescriptions Disp Refills    albuterol (PROVENTIL) (2.5 MG/3ML) 0.083% nebulizer solution 180 mL 0     Sig: USE 1 VIAL IN NEBULIZER EVERY 6 HOURS AS NEEDED FOR WHEEZING (ASTHMA)    albuterol sulfate HFA (PROAIR HFA) 108 (90 Base) MCG/ACT inhaler 18 g 5     Sig: Inhale 2 puffs into the lungs every 6 hours as needed for Wheezing     Last Filled:  09/03/2024    Last appt: 8/30/2024   Next appt: 2/28/2025

## 2024-10-09 DIAGNOSIS — N18.30 TYPE 2 DIABETES MELLITUS WITH STAGE 3 CHRONIC KIDNEY DISEASE, WITHOUT LONG-TERM CURRENT USE OF INSULIN, UNSPECIFIED WHETHER STAGE 3A OR 3B CKD (HCC): ICD-10-CM

## 2024-10-09 DIAGNOSIS — E11.22 TYPE 2 DIABETES MELLITUS WITH STAGE 3 CHRONIC KIDNEY DISEASE, WITHOUT LONG-TERM CURRENT USE OF INSULIN, UNSPECIFIED WHETHER STAGE 3A OR 3B CKD (HCC): ICD-10-CM

## 2024-10-09 RX ORDER — GLIPIZIDE 2.5 MG/1
TABLET, EXTENDED RELEASE ORAL
Qty: 90 TABLET | Refills: 1 | Status: SHIPPED | OUTPATIENT
Start: 2024-10-09

## 2024-10-09 NOTE — TELEPHONE ENCOUNTER
Medication:   Requested Prescriptions     Pending Prescriptions Disp Refills    glipiZIDE (GLUCOTROL XL) 2.5 MG extended release tablet [Pharmacy Med Name: glipiZIDE ER 2.5 MG Oral Tablet Extended Release 24 Hour] 90 tablet 0     Sig: Take 1 tablet by mouth once daily     Last Filled:  7/29/24    Last appt: 8/30/2024   Next appt: 2/28/2025

## 2024-10-21 DIAGNOSIS — I10 ESSENTIAL HYPERTENSION: ICD-10-CM

## 2024-10-21 RX ORDER — METOPROLOL SUCCINATE 50 MG/1
50 TABLET, EXTENDED RELEASE ORAL DAILY
Qty: 90 TABLET | Refills: 0 | Status: SHIPPED | OUTPATIENT
Start: 2024-10-21

## 2024-11-06 ENCOUNTER — TELEPHONE (OUTPATIENT)
Dept: PRIMARY CARE CLINIC | Age: 79
End: 2024-11-06

## 2024-11-06 NOTE — TELEPHONE ENCOUNTER
After two attempts to get a hold of [employer], I was able to leave a message to request patients mammogram results.

## 2024-11-06 NOTE — TELEPHONE ENCOUNTER
----- Message from Dr. Marjorie Bond, DO sent at 10/31/2024  8:11 AM EDT -----  Please check [employer] to see if mammogram was completed.

## 2024-12-16 ENCOUNTER — TELEPHONE (OUTPATIENT)
Dept: PRIMARY CARE CLINIC | Age: 79
End: 2024-12-16

## 2024-12-16 DIAGNOSIS — J45.40 MODERATE PERSISTENT ASTHMA WITHOUT COMPLICATION: ICD-10-CM

## 2024-12-16 RX ORDER — ALBUTEROL SULFATE 0.83 MG/ML
SOLUTION RESPIRATORY (INHALATION)
Qty: 180 ML | Refills: 0 | Status: SHIPPED | OUTPATIENT
Start: 2024-12-16

## 2024-12-16 NOTE — TELEPHONE ENCOUNTER
Patient is calling for refill on albuterol (PROVENTIL) (2.5 MG/3ML) 0.083% nebulizer solution    Pharmacy: [address] [address] [facility] [phone]    Patient will be in [place] until March 2025.

## 2025-01-06 DIAGNOSIS — J45.40 MODERATE PERSISTENT ASTHMA WITHOUT COMPLICATION: ICD-10-CM

## 2025-01-07 DIAGNOSIS — I10 ESSENTIAL HYPERTENSION: ICD-10-CM

## 2025-01-07 RX ORDER — ALBUTEROL SULFATE 90 UG/1
2 INHALANT RESPIRATORY (INHALATION) EVERY 6 HOURS PRN
Qty: 18 G | Refills: 0 | Status: SHIPPED | OUTPATIENT
Start: 2025-01-07

## 2025-01-07 RX ORDER — AMLODIPINE BESYLATE 2.5 MG/1
2.5 TABLET ORAL DAILY
Qty: 90 TABLET | Refills: 1 | Status: SHIPPED | OUTPATIENT
Start: 2025-01-07

## 2025-01-07 NOTE — TELEPHONE ENCOUNTER
Medication:   Requested Prescriptions     Pending Prescriptions Disp Refills    amLODIPine (NORVASC) 2.5 MG tablet 90 tablet 1     Sig: Take 1 tablet by mouth daily     Last Filled:  n/a    Last appt: 8/30/2024   Next appt: 2/28/2025

## 2025-01-27 DIAGNOSIS — J45.40 MODERATE PERSISTENT ASTHMA WITHOUT COMPLICATION: ICD-10-CM

## 2025-01-27 DIAGNOSIS — I10 ESSENTIAL HYPERTENSION: ICD-10-CM

## 2025-01-27 DIAGNOSIS — E11.22 TYPE 2 DIABETES MELLITUS WITH STAGE 3 CHRONIC KIDNEY DISEASE, WITHOUT LONG-TERM CURRENT USE OF INSULIN, UNSPECIFIED WHETHER STAGE 3A OR 3B CKD (HCC): ICD-10-CM

## 2025-01-27 DIAGNOSIS — N18.30 TYPE 2 DIABETES MELLITUS WITH STAGE 3 CHRONIC KIDNEY DISEASE, WITHOUT LONG-TERM CURRENT USE OF INSULIN, UNSPECIFIED WHETHER STAGE 3A OR 3B CKD (HCC): ICD-10-CM

## 2025-01-27 RX ORDER — METOPROLOL SUCCINATE 50 MG/1
50 TABLET, EXTENDED RELEASE ORAL DAILY
Qty: 90 TABLET | Refills: 1 | Status: SHIPPED | OUTPATIENT
Start: 2025-01-27

## 2025-01-27 RX ORDER — GLIPIZIDE 2.5 MG/1
TABLET, EXTENDED RELEASE ORAL
Qty: 90 TABLET | Refills: 1 | Status: SHIPPED | OUTPATIENT
Start: 2025-01-27

## 2025-01-27 RX ORDER — ALBUTEROL SULFATE 90 UG/1
2 INHALANT RESPIRATORY (INHALATION) EVERY 6 HOURS PRN
Qty: 18 G | Refills: 3 | Status: SHIPPED | OUTPATIENT
Start: 2025-01-27

## 2025-01-27 NOTE — TELEPHONE ENCOUNTER
Medication:   Requested Prescriptions     Pending Prescriptions Disp Refills    albuterol sulfate HFA (PROVENTIL;VENTOLIN;PROAIR) 108 (90 Base) MCG/ACT inhaler 18 g 0     Si puffs every 6 hours as needed for Wheezing    glipiZIDE (GLUCOTROL XL) 2.5 MG extended release tablet 90 tablet 1     Sig: Take 1 tablet by mouth once daily    metoprolol succinate (TOPROL XL) 50 MG extended release tablet 90 tablet 0     Sig: Take 1 tablet by mouth daily     Last appt: 2024   Next appt: 2025

## 2025-02-19 DIAGNOSIS — J45.40 MODERATE PERSISTENT ASTHMA WITHOUT COMPLICATION: ICD-10-CM

## 2025-02-19 RX ORDER — ALBUTEROL SULFATE 0.83 MG/ML
SOLUTION RESPIRATORY (INHALATION)
Qty: 180 ML | Refills: 0 | Status: SHIPPED | OUTPATIENT
Start: 2025-02-19 | End: 2025-02-19 | Stop reason: SDUPTHER

## 2025-02-19 NOTE — TELEPHONE ENCOUNTER
Medication:   Requested Prescriptions     Pending Prescriptions Disp Refills    albuterol (PROVENTIL) (2.5 MG/3ML) 0.083% nebulizer solution 180 mL 0     Sig: USE 1 VIAL IN NEBULIZER EVERY 6 HOURS AS NEEDED FOR WHEEZING (ASTHMA)     Last appt: 8/30/2024   Next appt: 2/28/2025

## 2025-02-19 NOTE — TELEPHONE ENCOUNTER
Medication:   Requested Prescriptions     Pending Prescriptions Disp Refills    albuterol (PROVENTIL) (2.5 MG/3ML) 0.083% nebulizer solution 180 mL 0     Sig: USE 1 VIAL IN NEBULIZER EVERY 6 HOURS AS NEEDED FOR WHEEZING (ASTHMA)     Last Filled:  02/19/2025    Last appt: 8/30/2024   Next appt: 3/28/2025

## 2025-02-19 NOTE — TELEPHONE ENCOUNTER
Medication:   Requested Prescriptions     Pending Prescriptions Disp Refills    albuterol (PROVENTIL) (2.5 MG/3ML) 0.083% nebulizer solution 180 mL 0     Sig: USE 1 VIAL IN NEBULIZER EVERY 6 HOURS AS NEEDED FOR WHEEZING (ASTHMA)     Last Filled:      Last appt: 8/30/2024   Next appt: 3/28/2025    *sent to wrong pharmacy*

## 2025-02-20 RX ORDER — ALBUTEROL SULFATE 0.83 MG/ML
SOLUTION RESPIRATORY (INHALATION)
Qty: 180 ML | Refills: 0 | Status: SHIPPED | OUTPATIENT
Start: 2025-02-20

## 2025-03-17 DIAGNOSIS — E26.9 HYPERALDOSTERONISM: ICD-10-CM

## 2025-03-17 RX ORDER — SPIRONOLACTONE 25 MG/1
25 TABLET ORAL EVERY MORNING
Qty: 90 TABLET | Refills: 0 | Status: SHIPPED | OUTPATIENT
Start: 2025-03-17

## 2025-03-17 NOTE — TELEPHONE ENCOUNTER
Medication:   Requested Prescriptions     Pending Prescriptions Disp Refills    spironolactone (ALDACTONE) 25 MG tablet [Pharmacy Med Name: Spironolactone 25 MG Oral Tablet] 90 tablet 0     Sig: TAKE 1 TABLET BY MOUTH ONCE DAILY IN THE MORNING     Last Filled:  12/16/24    Last appt: 8/30/2024   Next appt: 4/30/2025

## 2025-04-01 DIAGNOSIS — J45.40 MODERATE PERSISTENT ASTHMA WITHOUT COMPLICATION: ICD-10-CM

## 2025-04-01 RX ORDER — ALBUTEROL SULFATE 90 UG/1
2 INHALANT RESPIRATORY (INHALATION) EVERY 6 HOURS PRN
Qty: 18 G | Refills: 3 | Status: SHIPPED | OUTPATIENT
Start: 2025-04-01

## 2025-04-21 DIAGNOSIS — E78.2 MIXED HYPERLIPIDEMIA: ICD-10-CM

## 2025-04-21 DIAGNOSIS — J45.40 MODERATE PERSISTENT ASTHMA WITHOUT COMPLICATION: ICD-10-CM

## 2025-04-21 RX ORDER — ALBUTEROL SULFATE 0.83 MG/ML
SOLUTION RESPIRATORY (INHALATION)
Qty: 180 ML | Refills: 1 | Status: SHIPPED | OUTPATIENT
Start: 2025-04-21

## 2025-04-21 RX ORDER — PRAVASTATIN SODIUM 80 MG/1
80 TABLET ORAL NIGHTLY
Qty: 90 TABLET | Refills: 1 | Status: SHIPPED | OUTPATIENT
Start: 2025-04-21

## 2025-04-21 NOTE — TELEPHONE ENCOUNTER
Medication:   Requested Prescriptions     Pending Prescriptions Disp Refills    pravastatin (PRAVACHOL) 80 MG tablet [Pharmacy Med Name: Pravastatin Sodium 80 MG Oral Tablet] 90 tablet 0     Sig: Take 1 tablet by mouth nightly    albuterol (PROVENTIL) (2.5 MG/3ML) 0.083% nebulizer solution [Pharmacy Med Name: Albuterol Sulfate (2.5 MG/3ML) 0.083% Inhalation Nebulization Solution] 180 mL 0     Sig: USE 1 VIAL IN NEBULIZER EVERY 6 HOURS AS NEEDED FOR WHEEZING (ASTHMA)     Last Filled:  2/20/25    Last appt: 8/30/2024   Next appt: 5/27/2025

## 2025-05-27 ENCOUNTER — OFFICE VISIT (OUTPATIENT)
Dept: PRIMARY CARE CLINIC | Age: 80
End: 2025-05-27

## 2025-05-27 VITALS
OXYGEN SATURATION: 97 % | SYSTOLIC BLOOD PRESSURE: 139 MMHG | TEMPERATURE: 97.5 F | BODY MASS INDEX: 22.23 KG/M2 | DIASTOLIC BLOOD PRESSURE: 66 MMHG | WEIGHT: 131.8 LBS | HEART RATE: 64 BPM

## 2025-05-27 DIAGNOSIS — J45.40 MODERATE PERSISTENT ASTHMA WITHOUT COMPLICATION: ICD-10-CM

## 2025-05-27 DIAGNOSIS — E11.22 TYPE 2 DIABETES MELLITUS WITH STAGE 3 CHRONIC KIDNEY DISEASE, WITHOUT LONG-TERM CURRENT USE OF INSULIN, UNSPECIFIED WHETHER STAGE 3A OR 3B CKD (HCC): Primary | ICD-10-CM

## 2025-05-27 DIAGNOSIS — N18.30 TYPE 2 DIABETES MELLITUS WITH STAGE 3 CHRONIC KIDNEY DISEASE, WITHOUT LONG-TERM CURRENT USE OF INSULIN, UNSPECIFIED WHETHER STAGE 3A OR 3B CKD (HCC): Primary | ICD-10-CM

## 2025-05-27 DIAGNOSIS — N18.32 CHRONIC KIDNEY DISEASE, STAGE 3B (HCC): ICD-10-CM

## 2025-05-27 DIAGNOSIS — B37.0 ORAL THRUSH: ICD-10-CM

## 2025-05-27 RX ORDER — ALBUTEROL SULFATE 90 UG/1
2 INHALANT RESPIRATORY (INHALATION) EVERY 6 HOURS PRN
Qty: 18 G | Refills: 3 | Status: SHIPPED | OUTPATIENT
Start: 2025-05-27

## 2025-05-27 RX ORDER — NYSTATIN 100000 [USP'U]/ML
500000 SUSPENSION ORAL 4 TIMES DAILY
Qty: 200 ML | Refills: 0 | Status: SHIPPED | OUTPATIENT
Start: 2025-05-27 | End: 2025-06-06

## 2025-05-27 SDOH — ECONOMIC STABILITY: FOOD INSECURITY: WITHIN THE PAST 12 MONTHS, YOU WORRIED THAT YOUR FOOD WOULD RUN OUT BEFORE YOU GOT MONEY TO BUY MORE.: NEVER TRUE

## 2025-05-27 SDOH — ECONOMIC STABILITY: FOOD INSECURITY: WITHIN THE PAST 12 MONTHS, THE FOOD YOU BOUGHT JUST DIDN'T LAST AND YOU DIDN'T HAVE MONEY TO GET MORE.: NEVER TRUE

## 2025-05-27 ASSESSMENT — PATIENT HEALTH QUESTIONNAIRE - PHQ9
1. LITTLE INTEREST OR PLEASURE IN DOING THINGS: NOT AT ALL
2. FEELING DOWN, DEPRESSED OR HOPELESS: NOT AT ALL
SUM OF ALL RESPONSES TO PHQ QUESTIONS 1-9: 0

## 2025-05-27 ASSESSMENT — ENCOUNTER SYMPTOMS
SHORTNESS OF BREATH: 0
DIARRHEA: 0
VOMITING: 0
NAUSEA: 0
ABDOMINAL PAIN: 0

## 2025-06-23 DIAGNOSIS — E11.22 TYPE 2 DIABETES MELLITUS WITH STAGE 3 CHRONIC KIDNEY DISEASE, WITHOUT LONG-TERM CURRENT USE OF INSULIN, UNSPECIFIED WHETHER STAGE 3A OR 3B CKD (HCC): ICD-10-CM

## 2025-06-23 DIAGNOSIS — N18.30 TYPE 2 DIABETES MELLITUS WITH STAGE 3 CHRONIC KIDNEY DISEASE, WITHOUT LONG-TERM CURRENT USE OF INSULIN, UNSPECIFIED WHETHER STAGE 3A OR 3B CKD (HCC): ICD-10-CM

## 2025-06-23 LAB
ANION GAP SERPL CALCULATED.3IONS-SCNC: 11 MMOL/L (ref 3–16)
BUN SERPL-MCNC: 13 MG/DL (ref 7–20)
CALCIUM SERPL-MCNC: 10 MG/DL (ref 8.3–10.6)
CHLORIDE SERPL-SCNC: 97 MMOL/L (ref 99–110)
CHOLEST SERPL-MCNC: 170 MG/DL (ref 0–199)
CO2 SERPL-SCNC: 24 MMOL/L (ref 21–32)
CREAT SERPL-MCNC: 1.4 MG/DL (ref 0.6–1.2)
CREAT UR-MCNC: 78.6 MG/DL (ref 28–259)
GFR SERPLBLD CREATININE-BSD FMLA CKD-EPI: 39 ML/MIN/{1.73_M2}
GLUCOSE SERPL-MCNC: 134 MG/DL (ref 70–99)
HDLC SERPL-MCNC: 55 MG/DL (ref 40–60)
LDL CHOLESTEROL: 98 MG/DL
MICROALBUMIN UR DL<=1MG/L-MCNC: <1.2 MG/DL
MICROALBUMIN/CREAT UR: NORMAL MG/G (ref 0–30)
POTASSIUM SERPL-SCNC: 5.2 MMOL/L (ref 3.5–5.1)
SODIUM SERPL-SCNC: 132 MMOL/L (ref 136–145)
TRIGL SERPL-MCNC: 85 MG/DL (ref 0–150)
VLDLC SERPL CALC-MCNC: 17 MG/DL

## 2025-06-24 ENCOUNTER — RESULTS FOLLOW-UP (OUTPATIENT)
Dept: PRIMARY CARE CLINIC | Age: 80
End: 2025-06-24

## 2025-06-24 DIAGNOSIS — E26.9 HYPERALDOSTERONISM: ICD-10-CM

## 2025-06-24 DIAGNOSIS — I10 ESSENTIAL HYPERTENSION: ICD-10-CM

## 2025-06-24 LAB
EST. AVERAGE GLUCOSE BLD GHB EST-MCNC: 157.1 MG/DL
HBA1C MFR BLD: 7.1 %

## 2025-06-24 RX ORDER — SPIRONOLACTONE 25 MG/1
12.5 TABLET ORAL EVERY MORNING
Qty: 45 TABLET | Refills: 1 | Status: SHIPPED | OUTPATIENT
Start: 2025-06-24

## 2025-06-24 RX ORDER — AMLODIPINE BESYLATE 5 MG/1
5 TABLET ORAL DAILY
Qty: 90 TABLET | Refills: 1 | Status: SHIPPED | OUTPATIENT
Start: 2025-06-24

## 2025-06-30 ENCOUNTER — HOSPITAL ENCOUNTER (INPATIENT)
Age: 80
LOS: 5 days | Discharge: HOME | End: 2025-07-05
Attending: EMERGENCY MEDICINE | Admitting: INTERNAL MEDICINE
Payer: MEDICARE

## 2025-06-30 ENCOUNTER — APPOINTMENT (OUTPATIENT)
Dept: GENERAL RADIOLOGY | Age: 80
End: 2025-06-30
Payer: MEDICARE

## 2025-06-30 DIAGNOSIS — J45.51 SEVERE PERSISTENT ASTHMA WITH EXACERBATION (HCC): Primary | ICD-10-CM

## 2025-06-30 DIAGNOSIS — E87.1 HYPONATREMIA: ICD-10-CM

## 2025-06-30 DIAGNOSIS — R09.02 HYPOXIA: ICD-10-CM

## 2025-06-30 PROBLEM — J45.901 ASTHMA EXACERBATION ATTACKS: Status: ACTIVE | Noted: 2025-06-30

## 2025-06-30 LAB
ANION GAP SERPL CALCULATED.3IONS-SCNC: 17 MMOL/L (ref 3–16)
BASE EXCESS BLDV CALC-SCNC: -5.5 MMOL/L (ref -2–3)
BASOPHILS # BLD: 0.1 K/UL (ref 0–0.2)
BASOPHILS NFR BLD: 0.2 %
BUN SERPL-MCNC: 21 MG/DL (ref 7–20)
CALCIUM SERPL-MCNC: 9.4 MG/DL (ref 8.3–10.6)
CHLORIDE SERPL-SCNC: 96 MMOL/L (ref 99–110)
CO2 BLDV-SCNC: 22 MMOL/L
CO2 SERPL-SCNC: 18 MMOL/L (ref 21–32)
COHGB MFR BLDV: 1 % (ref 0–1.5)
CREAT SERPL-MCNC: 1.5 MG/DL (ref 0.6–1.2)
DEPRECATED RDW RBC AUTO: 14.3 % (ref 12.4–15.4)
DO-HGB MFR BLDV: 34.2 %
EKG ATRIAL RATE: 108 BPM
EKG DIAGNOSIS: NORMAL
EKG P AXIS: 87 DEGREES
EKG P-R INTERVAL: 150 MS
EKG Q-T INTERVAL: 318 MS
EKG QRS DURATION: 68 MS
EKG QTC CALCULATION (BAZETT): 426 MS
EKG R AXIS: 16 DEGREES
EKG T AXIS: 89 DEGREES
EKG VENTRICULAR RATE: 108 BPM
EOSINOPHIL # BLD: 0 K/UL (ref 0–0.6)
EOSINOPHIL NFR BLD: 0.1 %
GFR SERPLBLD CREATININE-BSD FMLA CKD-EPI: 35 ML/MIN/{1.73_M2}
GLUCOSE SERPL-MCNC: 228 MG/DL (ref 70–99)
HCO3 BLDV-SCNC: 21 MMOL/L (ref 24–28)
HCT VFR BLD AUTO: 37.5 % (ref 36–48)
HGB BLD-MCNC: 12.4 G/DL (ref 12–16)
LYMPHOCYTES # BLD: 4.5 K/UL (ref 1–5.1)
LYMPHOCYTES NFR BLD: 20.6 %
MCH RBC QN AUTO: 27.1 PG (ref 26–34)
MCHC RBC AUTO-ENTMCNC: 33 G/DL (ref 31–36)
MCV RBC AUTO: 82.2 FL (ref 80–100)
METHGB MFR BLDV: 0.1 % (ref 0–1.5)
MONOCYTES # BLD: 0.8 K/UL (ref 0–1.3)
MONOCYTES NFR BLD: 3.6 %
NEUTROPHILS # BLD: 16.5 K/UL (ref 1.7–7.7)
NEUTROPHILS NFR BLD: 75.5 %
NT-PROBNP SERPL-MCNC: 221 PG/ML (ref 0–449)
PCO2 BLDV: 44.2 MMHG (ref 41–51)
PH BLDV: 7.29 [PH] (ref 7.35–7.45)
PLATELET # BLD AUTO: 394 K/UL (ref 135–450)
PMV BLD AUTO: 6.7 FL (ref 5–10.5)
PO2 BLDV: 38.5 MMHG (ref 25–40)
POTASSIUM SERPL-SCNC: 4.7 MMOL/L (ref 3.5–5.1)
RBC # BLD AUTO: 4.56 M/UL (ref 4–5.2)
SAO2 % BLDV: 65 %
SODIUM SERPL-SCNC: 131 MMOL/L (ref 136–145)
TROPONIN, HIGH SENSITIVITY: 29 NG/L (ref 0–14)
TROPONIN, HIGH SENSITIVITY: 34 NG/L (ref 0–14)
WBC # BLD AUTO: 21.9 K/UL (ref 4–11)

## 2025-06-30 PROCEDURE — 93005 ELECTROCARDIOGRAM TRACING: CPT | Performed by: EMERGENCY MEDICINE

## 2025-06-30 PROCEDURE — 6360000002 HC RX W HCPCS: Performed by: EMERGENCY MEDICINE

## 2025-06-30 PROCEDURE — 99285 EMERGENCY DEPT VISIT HI MDM: CPT

## 2025-06-30 PROCEDURE — 6370000000 HC RX 637 (ALT 250 FOR IP): Performed by: INTERNAL MEDICINE

## 2025-06-30 PROCEDURE — 94644 CONT INHLJ TX 1ST HOUR: CPT

## 2025-06-30 PROCEDURE — 84484 ASSAY OF TROPONIN QUANT: CPT

## 2025-06-30 PROCEDURE — 71045 X-RAY EXAM CHEST 1 VIEW: CPT

## 2025-06-30 PROCEDURE — 80048 BASIC METABOLIC PNL TOTAL CA: CPT

## 2025-06-30 PROCEDURE — 2060000000 HC ICU INTERMEDIATE R&B

## 2025-06-30 PROCEDURE — 82803 BLOOD GASES ANY COMBINATION: CPT

## 2025-06-30 PROCEDURE — 83880 ASSAY OF NATRIURETIC PEPTIDE: CPT

## 2025-06-30 PROCEDURE — 85025 COMPLETE CBC W/AUTO DIFF WBC: CPT

## 2025-06-30 RX ORDER — ONDANSETRON 2 MG/ML
4 INJECTION INTRAMUSCULAR; INTRAVENOUS EVERY 6 HOURS PRN
Status: DISCONTINUED | OUTPATIENT
Start: 2025-06-30 | End: 2025-07-05 | Stop reason: HOSPADM

## 2025-06-30 RX ORDER — ENOXAPARIN SODIUM 100 MG/ML
30 INJECTION SUBCUTANEOUS DAILY
Status: DISCONTINUED | OUTPATIENT
Start: 2025-07-01 | End: 2025-07-01

## 2025-06-30 RX ORDER — BRIMONIDINE TARTRATE 2 MG/ML
1 SOLUTION/ DROPS OPHTHALMIC 3 TIMES DAILY
Status: DISCONTINUED | OUTPATIENT
Start: 2025-06-30 | End: 2025-07-05 | Stop reason: HOSPADM

## 2025-06-30 RX ORDER — SODIUM CHLORIDE 0.9 % (FLUSH) 0.9 %
5-40 SYRINGE (ML) INJECTION PRN
Status: DISCONTINUED | OUTPATIENT
Start: 2025-06-30 | End: 2025-07-05 | Stop reason: HOSPADM

## 2025-06-30 RX ORDER — DORZOLAMIDE HCL 20 MG/ML
1 SOLUTION/ DROPS OPHTHALMIC 3 TIMES DAILY
Status: DISCONTINUED | OUTPATIENT
Start: 2025-06-30 | End: 2025-07-05 | Stop reason: HOSPADM

## 2025-06-30 RX ORDER — AMLODIPINE BESYLATE 5 MG/1
5 TABLET ORAL DAILY
Status: DISCONTINUED | OUTPATIENT
Start: 2025-07-01 | End: 2025-07-05 | Stop reason: HOSPADM

## 2025-06-30 RX ORDER — PREDNISONE 20 MG/1
40 TABLET ORAL DAILY
Status: COMPLETED | OUTPATIENT
Start: 2025-07-03 | End: 2025-07-05

## 2025-06-30 RX ORDER — IPRATROPIUM BROMIDE AND ALBUTEROL SULFATE 2.5; .5 MG/3ML; MG/3ML
1 SOLUTION RESPIRATORY (INHALATION)
Status: DISCONTINUED | OUTPATIENT
Start: 2025-07-01 | End: 2025-07-01

## 2025-06-30 RX ORDER — LATANOPROST 50 UG/ML
1 SOLUTION/ DROPS OPHTHALMIC DAILY
Status: DISCONTINUED | OUTPATIENT
Start: 2025-07-01 | End: 2025-07-05 | Stop reason: HOSPADM

## 2025-06-30 RX ORDER — ACETAMINOPHEN 325 MG/1
650 TABLET ORAL EVERY 6 HOURS PRN
Status: DISCONTINUED | OUTPATIENT
Start: 2025-06-30 | End: 2025-07-05 | Stop reason: HOSPADM

## 2025-06-30 RX ORDER — SODIUM CHLORIDE 0.9 % (FLUSH) 0.9 %
5-40 SYRINGE (ML) INJECTION EVERY 12 HOURS SCHEDULED
Status: DISCONTINUED | OUTPATIENT
Start: 2025-06-30 | End: 2025-07-05 | Stop reason: HOSPADM

## 2025-06-30 RX ORDER — POLYETHYLENE GLYCOL 3350 17 G/17G
17 POWDER, FOR SOLUTION ORAL DAILY PRN
Status: DISCONTINUED | OUTPATIENT
Start: 2025-06-30 | End: 2025-07-05 | Stop reason: HOSPADM

## 2025-06-30 RX ORDER — SODIUM CHLORIDE 9 MG/ML
INJECTION, SOLUTION INTRAVENOUS PRN
Status: DISCONTINUED | OUTPATIENT
Start: 2025-06-30 | End: 2025-07-05 | Stop reason: HOSPADM

## 2025-06-30 RX ORDER — METOPROLOL SUCCINATE 50 MG/1
50 TABLET, EXTENDED RELEASE ORAL DAILY
Status: DISCONTINUED | OUTPATIENT
Start: 2025-07-01 | End: 2025-07-05 | Stop reason: HOSPADM

## 2025-06-30 RX ORDER — ALBUTEROL SULFATE 0.83 MG/ML
2.5 SOLUTION RESPIRATORY (INHALATION) CONTINUOUS
Status: DISCONTINUED | OUTPATIENT
Start: 2025-06-30 | End: 2025-07-01

## 2025-06-30 RX ORDER — SPIRONOLACTONE 25 MG/1
12.5 TABLET ORAL EVERY MORNING
Status: DISCONTINUED | OUTPATIENT
Start: 2025-07-01 | End: 2025-07-05 | Stop reason: HOSPADM

## 2025-06-30 RX ORDER — ACETAMINOPHEN 650 MG/1
650 SUPPOSITORY RECTAL EVERY 6 HOURS PRN
Status: DISCONTINUED | OUTPATIENT
Start: 2025-06-30 | End: 2025-07-05 | Stop reason: HOSPADM

## 2025-06-30 RX ORDER — ONDANSETRON 4 MG/1
4 TABLET, ORALLY DISINTEGRATING ORAL EVERY 8 HOURS PRN
Status: DISCONTINUED | OUTPATIENT
Start: 2025-06-30 | End: 2025-07-05 | Stop reason: HOSPADM

## 2025-06-30 RX ORDER — PRAVASTATIN SODIUM 40 MG
80 TABLET ORAL NIGHTLY
Status: DISCONTINUED | OUTPATIENT
Start: 2025-06-30 | End: 2025-07-05 | Stop reason: HOSPADM

## 2025-06-30 RX ORDER — ALBUTEROL SULFATE 0.83 MG/ML
2.5 SOLUTION RESPIRATORY (INHALATION)
Status: DISCONTINUED | OUTPATIENT
Start: 2025-06-30 | End: 2025-07-05 | Stop reason: HOSPADM

## 2025-06-30 RX ADMIN — PRAVASTATIN SODIUM 80 MG: 80 TABLET ORAL at 23:59

## 2025-06-30 RX ADMIN — ALBUTEROL SULFATE 2.5 MG: 2.5 SOLUTION RESPIRATORY (INHALATION) at 18:58

## 2025-06-30 RX ADMIN — ALBUTEROL SULFATE 10 MG: 2.5 SOLUTION RESPIRATORY (INHALATION) at 18:56

## 2025-06-30 RX ADMIN — BRIMONIDINE TARTRATE 1 DROP: 2 SOLUTION OPHTHALMIC at 23:59

## 2025-06-30 RX ADMIN — DORZOLAMIDE HYDROCHLORIDE 1 DROP: 20 SOLUTION/ DROPS OPHTHALMIC at 23:59

## 2025-06-30 ASSESSMENT — PAIN DESCRIPTION - DESCRIPTORS: DESCRIPTORS: TIGHTNESS

## 2025-06-30 ASSESSMENT — PAIN DESCRIPTION - PAIN TYPE: TYPE: ACUTE PAIN

## 2025-06-30 ASSESSMENT — ENCOUNTER SYMPTOMS
NAUSEA: 0
SHORTNESS OF BREATH: 1
DIARRHEA: 0
CHEST TIGHTNESS: 1
ABDOMINAL PAIN: 0
VOMITING: 0

## 2025-06-30 ASSESSMENT — PAIN DESCRIPTION - ORIENTATION: ORIENTATION: MID

## 2025-06-30 ASSESSMENT — PAIN - FUNCTIONAL ASSESSMENT
PAIN_FUNCTIONAL_ASSESSMENT: 0-10
PAIN_FUNCTIONAL_ASSESSMENT: PREVENTS OR INTERFERES WITH ALL ACTIVE AND SOME PASSIVE ACTIVITIES

## 2025-06-30 ASSESSMENT — PAIN DESCRIPTION - LOCATION: LOCATION: CHEST

## 2025-06-30 ASSESSMENT — PAIN SCALES - GENERAL: PAINLEVEL_OUTOF10: 7

## 2025-06-30 ASSESSMENT — PAIN DESCRIPTION - FREQUENCY: FREQUENCY: CONTINUOUS

## 2025-06-30 NOTE — ED PROVIDER NOTES
THE [facility]  EMERGENCY DEPARTMENT ENCOUNTER          ATTENDING PHYSICIAN NOTE       Date of evaluation: 6/30/2025    Chief Complaint     Shortness of Breath (Pt reports after lunch she started feeling sob/ asthma flair up. Progressively worsened the next few hrs. On EMS arrival pt was 85% on room air. EMS gave 0.3 IM epi, 125mg solu medrol, and 1 duo neb tx )      History of Present Illness     Carla Galan is a 79 y.o. female with a history of asthma, chronic kidney disease, hypertension, type 2 diabetes, and hyperlipidemia who presents with shortness of breath that began this morning, concerned that she is having a very severe asthma attack.  She states that her nebulizer is not working at home and she was unable to use anything except her inhaler.  EMS found her satting 85% and gave her IM epinephrine, Solu-Medrol, and 1 nebulizer treatment.  She is now on 10 L of oxygen and normally does not wear oxygen    ASSESSMENT / PLAN  (MEDICAL DECISION MAKING)     INITIAL VITALS: BP: (!) 167/67, Temp: (!) 96.3 °F (35.7 °C), Pulse: (!) 117, Respirations: 28, SpO2: 100 %      Carla Galan is a 79 y.o. female with a history of asthma, chronic kidney disease, hypertension, type 2 diabetes, and hyperlipidemia here with shortness of breath since this morning with wheezing, symptoms concerning for severe acute asthma attack.  The patient was severely dyspneic and wheezing on arrival and was started on a continuous nebulizer.  She had received prehospital epinephrine and Solu-Medrol.  After the continuous nebulizer, the patient felt improved and her wheezing had decreased.  She still remained hypoxic requiring 2 L however.  White blood cell count was 21.9, BUN and creatinine are 21 and 1.5 which appears to be stable.  VBG shows a pH of 7.285 with a pCO2 of 44 and first troponin was 29, unclear the significance but repeat in progress.  BNP was 221 and chest x-ray did not demonstrate any abnormality.  This patient

## 2025-07-01 LAB
ANION GAP SERPL CALCULATED.3IONS-SCNC: 16 MMOL/L (ref 3–16)
BASOPHILS # BLD: 0 K/UL (ref 0–0.2)
BASOPHILS NFR BLD: 0.2 %
BUN SERPL-MCNC: 23 MG/DL (ref 7–20)
CALCIUM SERPL-MCNC: 9.9 MG/DL (ref 8.3–10.6)
CHLORIDE SERPL-SCNC: 96 MMOL/L (ref 99–110)
CO2 SERPL-SCNC: 19 MMOL/L (ref 21–32)
CREAT SERPL-MCNC: 1.4 MG/DL (ref 0.6–1.2)
DEPRECATED RDW RBC AUTO: 13.9 % (ref 12.4–15.4)
EOSINOPHIL # BLD: 0 K/UL (ref 0–0.6)
EOSINOPHIL NFR BLD: 0 %
GFR SERPLBLD CREATININE-BSD FMLA CKD-EPI: 39 ML/MIN/{1.73_M2}
GLUCOSE BLD-MCNC: 154 MG/DL (ref 70–99)
GLUCOSE BLD-MCNC: 157 MG/DL (ref 70–99)
GLUCOSE BLD-MCNC: 210 MG/DL (ref 70–99)
GLUCOSE BLD-MCNC: 222 MG/DL (ref 70–99)
GLUCOSE BLD-MCNC: 252 MG/DL (ref 70–99)
GLUCOSE SERPL-MCNC: 235 MG/DL (ref 70–99)
HCT VFR BLD AUTO: 36.3 % (ref 36–48)
HGB BLD-MCNC: 12.2 G/DL (ref 12–16)
LYMPHOCYTES # BLD: 1.7 K/UL (ref 1–5.1)
LYMPHOCYTES NFR BLD: 13.1 %
MCH RBC QN AUTO: 27.5 PG (ref 26–34)
MCHC RBC AUTO-ENTMCNC: 33.6 G/DL (ref 31–36)
MCV RBC AUTO: 81.9 FL (ref 80–100)
MONOCYTES # BLD: 0.2 K/UL (ref 0–1.3)
MONOCYTES NFR BLD: 1.6 %
NEUTROPHILS # BLD: 11.3 K/UL (ref 1.7–7.7)
NEUTROPHILS NFR BLD: 85.1 %
PERFORMED ON: ABNORMAL
PLATELET # BLD AUTO: 355 K/UL (ref 135–450)
PMV BLD AUTO: 6.9 FL (ref 5–10.5)
POTASSIUM SERPL-SCNC: 5 MMOL/L (ref 3.5–5.1)
RBC # BLD AUTO: 4.43 M/UL (ref 4–5.2)
SODIUM SERPL-SCNC: 131 MMOL/L (ref 136–145)
WBC # BLD AUTO: 13.3 K/UL (ref 4–11)

## 2025-07-01 PROCEDURE — 94640 AIRWAY INHALATION TREATMENT: CPT

## 2025-07-01 PROCEDURE — 2060000000 HC ICU INTERMEDIATE R&B

## 2025-07-01 PROCEDURE — 6370000000 HC RX 637 (ALT 250 FOR IP): Performed by: STUDENT IN AN ORGANIZED HEALTH CARE EDUCATION/TRAINING PROGRAM

## 2025-07-01 PROCEDURE — 36415 COLL VENOUS BLD VENIPUNCTURE: CPT

## 2025-07-01 PROCEDURE — 6370000000 HC RX 637 (ALT 250 FOR IP): Performed by: INTERNAL MEDICINE

## 2025-07-01 PROCEDURE — 85025 COMPLETE CBC W/AUTO DIFF WBC: CPT

## 2025-07-01 PROCEDURE — 2500000003 HC RX 250 WO HCPCS: Performed by: INTERNAL MEDICINE

## 2025-07-01 PROCEDURE — 80048 BASIC METABOLIC PNL TOTAL CA: CPT

## 2025-07-01 PROCEDURE — 2700000000 HC OXYGEN THERAPY PER DAY

## 2025-07-01 PROCEDURE — 94761 N-INVAS EAR/PLS OXIMETRY MLT: CPT

## 2025-07-01 PROCEDURE — 6360000002 HC RX W HCPCS: Performed by: INTERNAL MEDICINE

## 2025-07-01 RX ORDER — ENOXAPARIN SODIUM 100 MG/ML
40 INJECTION SUBCUTANEOUS DAILY
Status: DISCONTINUED | OUTPATIENT
Start: 2025-07-02 | End: 2025-07-02

## 2025-07-01 RX ORDER — INSULIN LISPRO 100 [IU]/ML
0-8 INJECTION, SOLUTION INTRAVENOUS; SUBCUTANEOUS
Status: DISCONTINUED | OUTPATIENT
Start: 2025-07-01 | End: 2025-07-05 | Stop reason: HOSPADM

## 2025-07-01 RX ORDER — INSULIN LISPRO 100 [IU]/ML
0-8 INJECTION, SOLUTION INTRAVENOUS; SUBCUTANEOUS ONCE
Status: COMPLETED | OUTPATIENT
Start: 2025-07-01 | End: 2025-07-01

## 2025-07-01 RX ORDER — IPRATROPIUM BROMIDE AND ALBUTEROL SULFATE 2.5; .5 MG/3ML; MG/3ML
1 SOLUTION RESPIRATORY (INHALATION)
Status: DISCONTINUED | OUTPATIENT
Start: 2025-07-01 | End: 2025-07-01

## 2025-07-01 RX ORDER — DEXTROSE MONOHYDRATE 100 MG/ML
INJECTION, SOLUTION INTRAVENOUS CONTINUOUS PRN
Status: DISCONTINUED | OUTPATIENT
Start: 2025-07-01 | End: 2025-07-02 | Stop reason: SDUPTHER

## 2025-07-01 RX ORDER — GLUCAGON 1 MG/ML
1 KIT INJECTION PRN
Status: DISCONTINUED | OUTPATIENT
Start: 2025-07-01 | End: 2025-07-05 | Stop reason: HOSPADM

## 2025-07-01 RX ORDER — IPRATROPIUM BROMIDE AND ALBUTEROL SULFATE 2.5; .5 MG/3ML; MG/3ML
1 SOLUTION RESPIRATORY (INHALATION)
Status: DISCONTINUED | OUTPATIENT
Start: 2025-07-02 | End: 2025-07-05

## 2025-07-01 RX ADMIN — BRIMONIDINE TARTRATE 1 DROP: 2 SOLUTION OPHTHALMIC at 08:08

## 2025-07-01 RX ADMIN — DORZOLAMIDE HYDROCHLORIDE 1 DROP: 20 SOLUTION/ DROPS OPHTHALMIC at 20:12

## 2025-07-01 RX ADMIN — IPRATROPIUM BROMIDE AND ALBUTEROL SULFATE 1 DOSE: .5; 2.5 SOLUTION RESPIRATORY (INHALATION) at 21:45

## 2025-07-01 RX ADMIN — LATANOPROST 1 DROP: 50 SOLUTION OPHTHALMIC at 20:12

## 2025-07-01 RX ADMIN — IPRATROPIUM BROMIDE AND ALBUTEROL SULFATE 1 DOSE: .5; 2.5 SOLUTION RESPIRATORY (INHALATION) at 07:35

## 2025-07-01 RX ADMIN — METOPROLOL SUCCINATE 50 MG: 50 TABLET, EXTENDED RELEASE ORAL at 08:05

## 2025-07-01 RX ADMIN — SODIUM CHLORIDE, PRESERVATIVE FREE 10 ML: 5 INJECTION INTRAVENOUS at 08:06

## 2025-07-01 RX ADMIN — ALBUTEROL SULFATE 2.5 MG: 2.5 SOLUTION RESPIRATORY (INHALATION) at 03:16

## 2025-07-01 RX ADMIN — WATER 40 MG: 1 INJECTION INTRAMUSCULAR; INTRAVENOUS; SUBCUTANEOUS at 16:55

## 2025-07-01 RX ADMIN — BRIMONIDINE TARTRATE 1 DROP: 2 SOLUTION OPHTHALMIC at 20:11

## 2025-07-01 RX ADMIN — WATER 40 MG: 1 INJECTION INTRAMUSCULAR; INTRAVENOUS; SUBCUTANEOUS at 12:10

## 2025-07-01 RX ADMIN — DORZOLAMIDE HYDROCHLORIDE 1 DROP: 20 SOLUTION/ DROPS OPHTHALMIC at 14:09

## 2025-07-01 RX ADMIN — SODIUM CHLORIDE, PRESERVATIVE FREE 10 ML: 5 INJECTION INTRAVENOUS at 20:12

## 2025-07-01 RX ADMIN — WATER 40 MG: 1 INJECTION INTRAMUSCULAR; INTRAVENOUS; SUBCUTANEOUS at 04:20

## 2025-07-01 RX ADMIN — SPIRONOLACTONE 12.5 MG: 25 TABLET ORAL at 08:05

## 2025-07-01 RX ADMIN — WATER 40 MG: 1 INJECTION INTRAMUSCULAR; INTRAVENOUS; SUBCUTANEOUS at 00:00

## 2025-07-01 RX ADMIN — ENOXAPARIN SODIUM 30 MG: 100 INJECTION SUBCUTANEOUS at 08:06

## 2025-07-01 RX ADMIN — INSULIN LISPRO 2 UNITS: 100 INJECTION, SOLUTION INTRAVENOUS; SUBCUTANEOUS at 20:11

## 2025-07-01 RX ADMIN — IPRATROPIUM BROMIDE AND ALBUTEROL SULFATE 1 DOSE: .5; 2.5 SOLUTION RESPIRATORY (INHALATION) at 11:35

## 2025-07-01 RX ADMIN — BRIMONIDINE TARTRATE 1 DROP: 2 SOLUTION OPHTHALMIC at 14:09

## 2025-07-01 RX ADMIN — INSULIN LISPRO 2 UNITS: 100 INJECTION, SOLUTION INTRAVENOUS; SUBCUTANEOUS at 12:10

## 2025-07-01 RX ADMIN — DORZOLAMIDE HYDROCHLORIDE 1 DROP: 20 SOLUTION/ DROPS OPHTHALMIC at 08:08

## 2025-07-01 RX ADMIN — IPRATROPIUM BROMIDE AND ALBUTEROL SULFATE 1 DOSE: .5; 2.5 SOLUTION RESPIRATORY (INHALATION) at 15:35

## 2025-07-01 RX ADMIN — WATER 40 MG: 1 INJECTION INTRAMUSCULAR; INTRAVENOUS; SUBCUTANEOUS at 23:18

## 2025-07-01 RX ADMIN — PRAVASTATIN SODIUM 80 MG: 80 TABLET ORAL at 20:11

## 2025-07-01 RX ADMIN — AMLODIPINE BESYLATE 5 MG: 5 TABLET ORAL at 08:05

## 2025-07-01 RX ADMIN — INSULIN LISPRO 2 UNITS: 100 INJECTION, SOLUTION INTRAVENOUS; SUBCUTANEOUS at 08:42

## 2025-07-01 NOTE — PROGRESS NOTES
Patient is alert and oriented x4. VSS on room air. Medications given per MAR, no side effects noted. Patient Up with assist of one. No complaints of pain, continuing to monitor and manage per MAR. Voiding well via BRP, bm this shift     Patient is currently resting in bed with bed alarm on for safety. Call light within reach and all fall precautions in place. Plan of care continues.    Tristian Russ RN

## 2025-07-01 NOTE — RT PROTOCOL NOTE
RT Nebulizer Bronchodilator Protocol Note    There is a bronchodilator order in the chart from a provider indicating to follow the RT Bronchodilator Protocol and there is an “Initiate RT Bronchodilator Protocol” order as well (see protocol at bottom of note).    CXR Findings:  XR CHEST PORTABLE  Result Date: 6/30/2025  Impression: 1. No airspace disease. Electronically signed by Jagdish Cloud MD      The findings from the last RT Protocol Assessment were as follows:  Smoking: Chronic pulmonary disease  Respiratory Pattern: Mild dyspnea at rest, irregular pattern, or RR 21-25 bpm  Breath Sounds: Severe inspiratory and expiratory wheezing or severely diminished  Cough: Strong, spontaneous, non-productive  Indication for Bronchodilator Therapy:    Bronchodilator Assessment Score: 14    Aerosolized bronchodilator medication orders have been revised according to the RT Nebulizer Bronchodilator Protocol below.    Respiratory Therapist to perform RT Therapy Protocol Assessment initially then follow the protocol.  Repeat RT Therapy Protocol Assessment PRN for score 0-3 or on second treatment, BID, and PRN for scores above 3.    No Indications - adjust the frequency to every 6 hours PRN wheezing or bronchospasm, if no treatments needed after 48 hours then discontinue using Per Protocol order mode.     If indication present, adjust the RT bronchodilator orders based on the Bronchodilator Assessment Score as indicated below.  If a patient is on this medication at home then do not decrease Frequency below that used at home.    0-3 - enter or revise RT bronchodilator order(s) to equivalent RT Bronchodilator order with Frequency of every 4 hours PRN for wheezing or increased work of breathing using Per Protocol order mode.       4-6 - enter or revise RT Bronchodilator order(s) to two equivalent RT bronchodilator orders with one order with BID Frequency and one order with Frequency of every 4 hours PRN wheezing or increased

## 2025-07-01 NOTE — PLAN OF CARE
Problem: Chronic Conditions and Co-morbidities  Goal: Patient's chronic conditions and co-morbidity symptoms are monitored and maintained or improved  Outcome: Progressing  Flowsheets (Taken 7/1/2025 0433)  Care Plan - Patient's Chronic Conditions and Co-Morbidity Symptoms are Monitored and Maintained or Improved:   Update acute care plan with appropriate goals if chronic or comorbid symptoms are exacerbated and prevent overall improvement and discharge   Collaborate with multidisciplinary team to address chronic and comorbid conditions and prevent exacerbation or deterioration   Monitor and assess patient's chronic conditions and comorbid symptoms for stability, deterioration, or improvement     Problem: Discharge Planning  Goal: Discharge to home or other facility with appropriate resources  Outcome: Progressing  Flowsheets (Taken 7/1/2025 0433)  Discharge to home or other facility with appropriate resources:   Identify barriers to discharge with patient and caregiver   Refer to discharge planning if patient needs post-hospital services based on physician order or complex needs related to functional status, cognitive ability or social support system   Identify discharge learning needs (meds, wound care, etc)   Arrange for needed discharge resources and transportation as appropriate   Arrange for interpreters to assist at discharge as needed     Problem: Pain  Goal: Verbalizes/displays adequate comfort level or baseline comfort level  Outcome: Progressing  Flowsheets (Taken 7/1/2025 0433)  Verbalizes/displays adequate comfort level or baseline comfort level:   Notify Licensed Independent Practitioner if interventions unsuccessful or patient reports new pain   Consider cultural and social influences on pain and pain management   Administer analgesics based on type and severity of pain and evaluate response   Implement non-pharmacological measures as appropriate and evaluate response   Assess pain using appropriate

## 2025-07-01 NOTE — ED NOTES
Patient Name: Carla Galan  : 1945 79 y.o.  MRN: [id number]  ED Room #: A01/A01-01     Chief complaint:   Chief Complaint   Patient presents with    Shortness of Breath     Pt reports after lunch she started feeling sob/ asthma flair up. Progressively worsened the next few hrs. On EMS arrival pt was 85% on room air. EMS gave 0.3 IM epi, 125mg solu medrol, and 1 duo neb tx      Hospital Problem/Diagnosis:   Hospital Problems           Last Modified POA    * (Principal) Asthma exacerbation attacks 2025 Yes         O2 Flow Rate:O2 Device: Nasal cannula O2 Flow Rate (L/min): 2 L/min (if applicable)  Cardiac Rhythm:   (if applicable)  Active LDA's:   Peripheral IV 25 Right Antecubital (Active)   Site Assessment Clean, dry & intact 25   Line Status Brisk blood return;Flushed;Normal saline locked 25   Phlebitis Assessment No symptoms 25   Infiltration Assessment 0 25   Dressing Status Clean, dry & intact 25   Dressing Type Transparent 25            How does patient ambulate? Low Fall Risk (Ambulates by themselves without support    2. How does patient take pills? Whole with Water    3. Is patient alert? Alert    4. Is patient oriented? To Person, To Place, To Time, To Situation, and Follows Commands    5.   Patient arrived from:  home  Facility Name: ___________________________________________    6. If patient is disoriented or from a Skill Nursing Facility has family been notified of admission? No    7. Patient belongings? Belongings: Clothing    Disposition of belongings? Kept with Patient     8. Any specific patient or family belongings/needs/dynamics?   a. Continent to bowel and bladder; patient on O2 via nasal cannula at 2 lpm    9. Miscellaneous comments/pending orders?  a.      If there are any additional questions please reach out to the Emergency Department.       Osvaldo Salazar RN  [phone]

## 2025-07-01 NOTE — PLAN OF CARE
Problem: Chronic Conditions and Co-morbidities  Goal: Patient's chronic conditions and co-morbidity symptoms are monitored and maintained or improved  7/1/2025 0834 by Tristian Russ RN  Outcome: Progressing  7/1/2025 0433 by Daniel Knowles RN  Outcome: Progressing  Flowsheets (Taken 7/1/2025 0433)  Care Plan - Patient's Chronic Conditions and Co-Morbidity Symptoms are Monitored and Maintained or Improved:   Update acute care plan with appropriate goals if chronic or comorbid symptoms are exacerbated and prevent overall improvement and discharge   Collaborate with multidisciplinary team to address chronic and comorbid conditions and prevent exacerbation or deterioration   Monitor and assess patient's chronic conditions and comorbid symptoms for stability, deterioration, or improvement     Problem: Discharge Planning  Goal: Discharge to home or other facility with appropriate resources  7/1/2025 0834 by Tristian Russ RN  Outcome: Progressing  7/1/2025 0433 by Daniel Knowles RN  Outcome: Progressing  Flowsheets (Taken 7/1/2025 0433)  Discharge to home or other facility with appropriate resources:   Identify barriers to discharge with patient and caregiver   Refer to discharge planning if patient needs post-hospital services based on physician order or complex needs related to functional status, cognitive ability or social support system   Identify discharge learning needs (meds, wound care, etc)   Arrange for needed discharge resources and transportation as appropriate   Arrange for interpreters to assist at discharge as needed     Problem: Pain  Goal: Verbalizes/displays adequate comfort level or baseline comfort level  7/1/2025 0834 by Tristian Russ RN  Outcome: Progressing  7/1/2025 0433 by Daniel Knowles RN  Outcome: Progressing  Flowsheets (Taken 7/1/2025 0433)  Verbalizes/displays adequate comfort level or baseline comfort level:   Notify Licensed Independent Practitioner if interventions  unsuccessful or patient reports new pain   Consider cultural and social influences on pain and pain management   Administer analgesics based on type and severity of pain and evaluate response   Implement non-pharmacological measures as appropriate and evaluate response   Assess pain using appropriate pain scale   Encourage patient to monitor pain and request assistance     Problem: Safety - Adult  Goal: Free from fall injury  7/1/2025 0834 by Tristian Russ RN  Outcome: Progressing  7/1/2025 0433 by Daniel Knowles RN  Outcome: Progressing  Flowsheets (Taken 7/1/2025 0433)  Free From Fall Injury:   Instruct family/caregiver on patient safety   Based on caregiver fall risk screen, instruct family/caregiver to ask for assistance with transferring infant if caregiver noted to have fall risk factors     Problem: Respiratory - Adult  Goal: Achieves optimal ventilation and oxygenation  7/1/2025 0834 by Tristian Russ RN  Outcome: Progressing  7/1/2025 0433 by Daniel Knowles RN  Outcome: Progressing  Flowsheets (Taken 7/1/2025 0433)  Achieves optimal ventilation and oxygenation:   Respiratory therapy support as indicated   Assess the need for suctioning and aspirate as needed   Initiate smoking cessation protocol as indicated   Position to facilitate oxygenation and minimize respiratory effort   Assess for changes in respiratory status   Assess for changes in mentation and behavior   Oxygen supplementation based on oxygen saturation or arterial blood gases   Encourage broncho-pulmonary hygiene including cough, deep breathe, incentive spirometry   Assess and instruct to report shortness of breath or any respiratory difficulty

## 2025-07-01 NOTE — PROGRESS NOTES
Pharmacist Review and Automatic Dose Adjustment of Prophylactic Enoxaparin    The reviewing pharmacist has made an adjustment to the ordered enoxaparin dose or converted to UFH per the approved [facility] protocol and table as defined below.    Plan / Rationale: Based upon the patient's weight and renal function, the ordered dose of enoxaparin 30 mg daily has been converted to 40 mg daily.    Please call with questions--  Zulma Eugene, PharmD, BCPS  Wireless: [phone]   7/1/2025 9:47 AM        Carla Galan is a 79 y.o. female.     Recent Labs     06/30/25  1838 07/01/25  0738   CREATININE 1.5* 1.4*       Estimated Creatinine Clearance: 30 mL/min (A) (based on SCr of 1.4 mg/dL (H)).    Recent Labs     06/30/25 1838 07/01/25  0738   HGB 12.4 12.2   HCT 37.5 36.3    355     No results for input(s): \"INR\" in the last 72 hours.    Height:   Ht Readings from Last 1 Encounters:   06/30/25 1.638 m (5' 4.5\")     Weight:  Wt Readings from Last 1 Encounters:   07/01/25 63.6 kg (140 lb 3.4 oz)

## 2025-07-01 NOTE — H&P
Hospital Medicine History & Physical      Date of Admission: 6/30/2025    Date of Service:  Pt seen/examined on 06/30/25     [x]Admitted to Inpatient with expected LOS greater than two midnights due to medical therapy.  []Placed in Observation status.    Chief Admission Complaint: Shortness of breath    Presenting Admission History:      79 y.o. female with PMHx significant for moderate persistent asthma, hypertension, hyperlipidemia, diabetes mellitus, CKD stage III, and gout, who presented to ED with a complaint of shortness of breath.  Patient has been short of breath for the last couple of days associated with wheezing and chest tightness and dry cough.  In ED patient was found to be in respiratory distress with mild wheezing.  Patient received continuous nebulizer treatment prior to that patient received IV steroids by EMS.  Patient was noted to be hypoxic requiring supplemental oxygen via nasal cannula and currently on 2 L/min.  Patient not on home oxygen at baseline.  Labs remarkable for WBC count of 20K, creatinine 1.5 which is her baseline troponin 29 and BNP of 221.  Chest x-ray shows no evidence of acute changes.  Patient is currently being admitted under inpatient status for further management evaluation.      ROS:     Review of 10 systems is negative except what is outlined in HPI.     Assessment:  Moderate persistent asthma with acute exacerbation  Acute hypoxic and hypercapnic respiratory failure.  Leukocytosis  Essential hypertension.  Hyperlipidemia.  Type 2 diabetes mellitus.  CKD stage IIIa.  Glaucoma    Plan:    Patient is admitted under inpatient status.  Start scheduled DuoNeb.  As needed albuterol.  IV Solu-Medrol for 24 hours with plans to transition to p.o. prednisone thereafter.  Hold off on antibiotics for now.  Continue supplemental oxygen via nasal cannula.  Wean oxygen as tolerated.  Repeat labs in the morning and monitor WBC count.  Hold oral glipizide  Add Lantus and sliding scale

## 2025-07-01 NOTE — PROGRESS NOTES
[facility] Medicine Progress Note  V 5.17      Date of Admission: 6/30/2025    Hospital Day: 2      Chief Admission Complaint:  Shortness of breath     Subjective: Patient was seen and examined at the bedside.  Patient was lying comfortably on his bed.  He was able to respond to verbal command but was not very much interested in interaction.  I will check by with him later during the day.    Presenting Admission History:       79 y.o. female with PMHx significant for moderate persistent asthma, hypertension, hyperlipidemia, diabetes mellitus, CKD stage III, and gout, who presented to ED with a complaint of shortness of breath.  Patient has been short of breath for the last couple of days associated with wheezing and chest tightness and dry cough.  In ED patient was found to be in respiratory distress with mild wheezing.  Patient received continuous nebulizer treatment prior to that patient received IV steroids by EMS.  Patient was noted to be hypoxic requiring supplemental oxygen via nasal cannula and currently on 2 L/min.  Patient not on home oxygen at baseline.  Labs remarkable for WBC count of 20K, creatinine 1.5 which is her baseline troponin 29 and BNP of 221.  Chest x-ray shows no evidence of acute changes.  Patient is currently being admitted under inpatient status for further management evaluation.     Assessment/Plan:      Moderate persistent asthma with acute exacerbation  Acute hypoxic and hypercapnic respiratory failure.  Leukocytosis  Essential hypertension.  Hyperlipidemia.  Type 2 diabetes mellitus.  CKD stage IIIa.  Glaucoma     Plan:    Continue scheduled DuoNeb.  As needed albuterol.  IV Solu-Medrol for 24 hours with plans to transition to p.o. prednisone from 07/03/25/ -07/06/25.  Hold off on antibiotics for now.  Continue supplemental oxygen via nasal cannula.  Wean oxygen as tolerated.  Repeat labs in the morning and monitor WBC count.  Hold oral glipizide  Add Lantus and sliding scale  insulin  Resume the rest of home medication as appropriate.  SQ Lovenox for DVT prophy      Consults:      None      --------------------------------------------------    Medications:        Infusion Medications    dextrose      sodium chloride       Scheduled Medications    ipratropium 0.5 mg-albuterol 2.5 mg  1 Dose Inhalation Q4H RT    insulin lispro  0-8 Units SubCUTAneous 4x Daily AC & HS    [START ON 7/2/2025] enoxaparin  40 mg SubCUTAneous Daily    amLODIPine  5 mg Oral Daily    brimonidine  1 drop Both Eyes TID    dorzolamide  1 drop Both Eyes TID    latanoprost  1 drop Both Eyes Daily    metoprolol succinate  50 mg Oral Daily    pravastatin  80 mg Oral Nightly    spironolactone  12.5 mg Oral QAM    sodium chloride flush  5-40 mL IntraVENous 2 times per day    methylPREDNISolone  40 mg IntraVENous Q6H    Followed by    [START ON 7/3/2025] predniSONE  40 mg Oral Daily     PRN Meds: glucose, dextrose bolus **OR** dextrose bolus, glucagon (rDNA), dextrose, sodium chloride flush, sodium chloride, ondansetron **OR** ondansetron, polyethylene glycol, acetaminophen **OR** acetaminophen, albuterol      Physical Exam Performed:      General appearance:  No apparent distress  Respiratory:  Normal respiratory effort without tachypnea. Scattered wheezing.  Cardiovascular:  Regular Rate w/ Regular rhythm.  Abdomen:  Soft, non-tender, non-distended. Bowel sounds normal  Musculoskeletal:  No edema  Neurologic:  Neurovascularly intact without focal sensory/motor deficits.  Psychiatric:  Oriented to Self    /63   Pulse 77   Temp 97.5 °F (36.4 °C) (Oral)   Resp 18   Ht 1.638 m (5' 4.5\")   Wt 63.6 kg (140 lb 3.4 oz)   SpO2 99%   BMI 23.70 kg/m²     Telemetry:      Personally reviewed and interpreted telemetry (Rhythm Strip) on 7/1/2025.  Patient is currently ON tele demonstrating NSR w/ controlled rate.    Diet: ADULT DIET; Regular    DVT Prophylaxis: PPX dose LMWH    Code status: Full Code    PT/OT Eval Status:

## 2025-07-01 NOTE — CARE COORDINATION
Case Management Assessment  Initial Evaluation    Date/Time of Evaluation: 7/1/2025 9:09 AM  Assessment Completed by: ALEXA SANTIZO    If patient is discharged prior to next notation, then this note serves as note for discharge by case management.    Patient Name: Carla Galan                   YOB: 1945  Diagnosis: Hypoxia [R09.02]  Severe persistent asthma with exacerbation (HCC) [J45.51]  Asthma exacerbation attacks [J45.901]                   Date / Time: 6/30/2025  5:52 PM    Patient Admission Status: Inpatient   Readmission Risk (Low < 19, Mod (19-27), High > 27): Readmission Risk Score: 12.3    Current PCP: Marjorie Bond, DO  PCP verified by CM? Yes    Chart Reviewed: Yes      History Provided by: Patient  Patient Orientation: Alert and Oriented    Patient Cognition: Alert    Hospitalization in the last 30 days (Readmission):  No    If yes, Readmission Assessment in CM Navigator will be completed.    Advance Directives:      Code Status: Full Code   Patient's Primary Decision Maker is: Legal Next of Kin    Primary Decision Maker: THIAGO BERRIOSWNA - [facility] - [phone]    Discharge Planning:    Patient lives with: Alone Type of Home: House  Primary Care Giver: Self  Patient Support Systems include: Family Members   Current Financial resources: Medicare  Current community resources: None  Current services prior to admission: None            Current DME:              Type of Home Care services:  None    ADLS  Prior functional level: Independent in ADLs/IADLs  Current functional level: Assistance with the following:, Mobility (PRN)    PT AM-PAC:   /24  OT AM-PAC:   /24    Family can provide assistance at DC: No  Would you like Case Management to discuss the discharge plan with any other family members/significant others, and if so, who? No  Plans to Return to Present Housing: Yes  Other Identified Issues/Barriers to RETURNING to current housing: n/a  Potential Assistance needed at  asthma with exacerbation (HCC) [J45.51]  Asthma exacerbation attacks [J45.901]    IF APPLICABLE: The Patient and/or patient representative Carla and her family were provided with a choice of provider and agrees with the discharge plan. Freedom of choice list with basic dialogue that supports the patient's individualized plan of care/goals and shares the quality data associated with the providers was provided to: Patient   Patient Representative Name:       The Patient and/or Patient Representative Agree with the Discharge Plan? Yes    ALEXA SANTIZO  Case Management Department  Ph: [phone] Fax:

## 2025-07-01 NOTE — PROGRESS NOTES
4 Eyes Skin Assessment     NAME:  Carla Galan  YOB: 1945  MEDICAL RECORD NUMBER:  [id number]    The patient is being assessed for  Admission    I agree that at least one RN has performed a thorough Head to Toe Skin Assessment on the patient. ALL assessment sites listed below have been assessed.      Areas assessed by both nurses:    Head, Face, Ears, Shoulders, Back, Chest, Arms, Elbows, Hands, Sacrum. Buttock, Coccyx, Ischium, Legs. Feet and Heels, and Under Medical Devices     Scattered bruising.     Does the Patient have a Wound? No noted wound(s)       Ruddy Prevention initiated by RN: No  Wound Care Orders initiated by RN: No    Pressure Injury (Stage 1,2,3,4, Unstageable, DTI, NWPT, and Complex wounds) if present, place Wound referral order by RN under : No    New Ostomies, if present place, Ostomy referral order under : No     Nurse 1 eSignature: Electronically signed by KAREN KEENAN RN on 7/1/25 at 7:25 PM EDT    **SHARE this note so that the co-signing nurse can place an eSignature**    Nurse 2 eSignature: Electronically signed by Ree Philip RN on 6/30/25 at 10:35 PM EDT

## 2025-07-02 LAB
ANION GAP SERPL CALCULATED.3IONS-SCNC: 13 MMOL/L (ref 3–16)
BASOPHILS # BLD: 0 K/UL (ref 0–0.2)
BASOPHILS NFR BLD: 0.1 %
BUN SERPL-MCNC: 32 MG/DL (ref 7–20)
CALCIUM SERPL-MCNC: 9.8 MG/DL (ref 8.3–10.6)
CHLORIDE SERPL-SCNC: 95 MMOL/L (ref 99–110)
CO2 SERPL-SCNC: 22 MMOL/L (ref 21–32)
CREAT SERPL-MCNC: 1.6 MG/DL (ref 0.6–1.2)
DEPRECATED RDW RBC AUTO: 14.1 % (ref 12.4–15.4)
EOSINOPHIL # BLD: 0 K/UL (ref 0–0.6)
EOSINOPHIL NFR BLD: 0 %
GFR SERPLBLD CREATININE-BSD FMLA CKD-EPI: 33 ML/MIN/{1.73_M2}
GLUCOSE BLD-MCNC: 170 MG/DL (ref 70–99)
GLUCOSE BLD-MCNC: 174 MG/DL (ref 70–99)
GLUCOSE BLD-MCNC: 174 MG/DL (ref 70–99)
GLUCOSE BLD-MCNC: 184 MG/DL (ref 70–99)
GLUCOSE BLD-MCNC: 216 MG/DL (ref 70–99)
GLUCOSE BLD-MCNC: 240 MG/DL (ref 70–99)
GLUCOSE SERPL-MCNC: 192 MG/DL (ref 70–99)
HCT VFR BLD AUTO: 37.6 % (ref 36–48)
HGB BLD-MCNC: 12.3 G/DL (ref 12–16)
LYMPHOCYTES # BLD: 2.3 K/UL (ref 1–5.1)
LYMPHOCYTES NFR BLD: 9.9 %
MCH RBC QN AUTO: 26.8 PG (ref 26–34)
MCHC RBC AUTO-ENTMCNC: 32.7 G/DL (ref 31–36)
MCV RBC AUTO: 81.9 FL (ref 80–100)
MONOCYTES # BLD: 0.7 K/UL (ref 0–1.3)
MONOCYTES NFR BLD: 2.9 %
NEUTROPHILS # BLD: 20.7 K/UL (ref 1.7–7.7)
NEUTROPHILS NFR BLD: 87.1 %
PERFORMED ON: ABNORMAL
PLATELET # BLD AUTO: 363 K/UL (ref 135–450)
PMV BLD AUTO: 6.9 FL (ref 5–10.5)
POTASSIUM SERPL-SCNC: 4.8 MMOL/L (ref 3.5–5.1)
POTASSIUM SERPL-SCNC: 5.3 MMOL/L (ref 3.5–5.1)
RBC # BLD AUTO: 4.6 M/UL (ref 4–5.2)
SODIUM SERPL-SCNC: 130 MMOL/L (ref 136–145)
WBC # BLD AUTO: 23.7 K/UL (ref 4–11)

## 2025-07-02 PROCEDURE — 94761 N-INVAS EAR/PLS OXIMETRY MLT: CPT

## 2025-07-02 PROCEDURE — 6360000002 HC RX W HCPCS: Performed by: INTERNAL MEDICINE

## 2025-07-02 PROCEDURE — 84132 ASSAY OF SERUM POTASSIUM: CPT

## 2025-07-02 PROCEDURE — 6370000000 HC RX 637 (ALT 250 FOR IP): Performed by: STUDENT IN AN ORGANIZED HEALTH CARE EDUCATION/TRAINING PROGRAM

## 2025-07-02 PROCEDURE — 6370000000 HC RX 637 (ALT 250 FOR IP): Performed by: INTERNAL MEDICINE

## 2025-07-02 PROCEDURE — 94640 AIRWAY INHALATION TREATMENT: CPT

## 2025-07-02 PROCEDURE — 2580000003 HC RX 258: Performed by: STUDENT IN AN ORGANIZED HEALTH CARE EDUCATION/TRAINING PROGRAM

## 2025-07-02 PROCEDURE — 36415 COLL VENOUS BLD VENIPUNCTURE: CPT

## 2025-07-02 PROCEDURE — 80048 BASIC METABOLIC PNL TOTAL CA: CPT

## 2025-07-02 PROCEDURE — 85025 COMPLETE CBC W/AUTO DIFF WBC: CPT

## 2025-07-02 PROCEDURE — 2060000000 HC ICU INTERMEDIATE R&B

## 2025-07-02 PROCEDURE — 2500000003 HC RX 250 WO HCPCS: Performed by: INTERNAL MEDICINE

## 2025-07-02 PROCEDURE — 6360000002 HC RX W HCPCS: Performed by: STUDENT IN AN ORGANIZED HEALTH CARE EDUCATION/TRAINING PROGRAM

## 2025-07-02 RX ORDER — DEXTROSE MONOHYDRATE 100 MG/ML
INJECTION, SOLUTION INTRAVENOUS CONTINUOUS PRN
Status: DISCONTINUED | OUTPATIENT
Start: 2025-07-02 | End: 2025-07-05 | Stop reason: HOSPADM

## 2025-07-02 RX ORDER — ENOXAPARIN SODIUM 100 MG/ML
30 INJECTION SUBCUTANEOUS DAILY
Status: DISCONTINUED | OUTPATIENT
Start: 2025-07-03 | End: 2025-07-03

## 2025-07-02 RX ORDER — SODIUM CHLORIDE 9 MG/ML
INJECTION, SOLUTION INTRAVENOUS CONTINUOUS
Status: DISCONTINUED | OUTPATIENT
Start: 2025-07-02 | End: 2025-07-05 | Stop reason: HOSPADM

## 2025-07-02 RX ORDER — CALCIUM GLUCONATE 20 MG/ML
1000 INJECTION, SOLUTION INTRAVENOUS ONCE
Status: COMPLETED | OUTPATIENT
Start: 2025-07-02 | End: 2025-07-02

## 2025-07-02 RX ADMIN — IPRATROPIUM BROMIDE AND ALBUTEROL SULFATE 1 DOSE: .5; 2.5 SOLUTION RESPIRATORY (INHALATION) at 06:35

## 2025-07-02 RX ADMIN — WATER 40 MG: 1 INJECTION INTRAMUSCULAR; INTRAVENOUS; SUBCUTANEOUS at 06:29

## 2025-07-02 RX ADMIN — DORZOLAMIDE HYDROCHLORIDE 1 DROP: 20 SOLUTION/ DROPS OPHTHALMIC at 13:26

## 2025-07-02 RX ADMIN — PRAVASTATIN SODIUM 80 MG: 80 TABLET ORAL at 19:40

## 2025-07-02 RX ADMIN — SODIUM CHLORIDE: 0.9 INJECTION, SOLUTION INTRAVENOUS at 10:51

## 2025-07-02 RX ADMIN — WATER 40 MG: 1 INJECTION INTRAMUSCULAR; INTRAVENOUS; SUBCUTANEOUS at 13:17

## 2025-07-02 RX ADMIN — IPRATROPIUM BROMIDE AND ALBUTEROL SULFATE 1 DOSE: .5; 2.5 SOLUTION RESPIRATORY (INHALATION) at 16:18

## 2025-07-02 RX ADMIN — INSULIN LISPRO 2 UNITS: 100 INJECTION, SOLUTION INTRAVENOUS; SUBCUTANEOUS at 19:40

## 2025-07-02 RX ADMIN — SODIUM CHLORIDE: 0.9 INJECTION, SOLUTION INTRAVENOUS at 19:45

## 2025-07-02 RX ADMIN — SODIUM CHLORIDE, PRESERVATIVE FREE 10 ML: 5 INJECTION INTRAVENOUS at 08:47

## 2025-07-02 RX ADMIN — SPIRONOLACTONE 12.5 MG: 25 TABLET ORAL at 08:43

## 2025-07-02 RX ADMIN — DORZOLAMIDE HYDROCHLORIDE 1 DROP: 20 SOLUTION/ DROPS OPHTHALMIC at 09:55

## 2025-07-02 RX ADMIN — IPRATROPIUM BROMIDE AND ALBUTEROL SULFATE 1 DOSE: .5; 2.5 SOLUTION RESPIRATORY (INHALATION) at 12:15

## 2025-07-02 RX ADMIN — BRIMONIDINE TARTRATE 1 DROP: 2 SOLUTION OPHTHALMIC at 13:26

## 2025-07-02 RX ADMIN — ENOXAPARIN SODIUM 40 MG: 100 INJECTION SUBCUTANEOUS at 08:49

## 2025-07-02 RX ADMIN — CALCIUM GLUCONATE 1000 MG: 20 INJECTION, SOLUTION INTRAVENOUS at 09:50

## 2025-07-02 RX ADMIN — AMLODIPINE BESYLATE 5 MG: 5 TABLET ORAL at 08:45

## 2025-07-02 RX ADMIN — SODIUM CHLORIDE, PRESERVATIVE FREE 10 ML: 5 INJECTION INTRAVENOUS at 19:40

## 2025-07-02 RX ADMIN — BRIMONIDINE TARTRATE 1 DROP: 2 SOLUTION OPHTHALMIC at 19:40

## 2025-07-02 RX ADMIN — DORZOLAMIDE HYDROCHLORIDE 1 DROP: 20 SOLUTION/ DROPS OPHTHALMIC at 19:40

## 2025-07-02 RX ADMIN — INSULIN LISPRO 2 UNITS: 100 INJECTION, SOLUTION INTRAVENOUS; SUBCUTANEOUS at 11:27

## 2025-07-02 RX ADMIN — BRIMONIDINE TARTRATE 1 DROP: 2 SOLUTION OPHTHALMIC at 08:47

## 2025-07-02 RX ADMIN — LATANOPROST 1 DROP: 50 SOLUTION OPHTHALMIC at 08:46

## 2025-07-02 RX ADMIN — METOPROLOL SUCCINATE 50 MG: 50 TABLET, EXTENDED RELEASE ORAL at 08:43

## 2025-07-02 RX ADMIN — INSULIN HUMAN 10 UNITS: 100 INJECTION, SOLUTION PARENTERAL at 09:41

## 2025-07-02 RX ADMIN — IPRATROPIUM BROMIDE AND ALBUTEROL SULFATE 1 DOSE: .5; 2.5 SOLUTION RESPIRATORY (INHALATION) at 19:50

## 2025-07-02 RX ADMIN — DEXTROSE MONOHYDRATE 250 ML: 100 INJECTION, SOLUTION INTRAVENOUS at 09:48

## 2025-07-02 RX ADMIN — WATER 40 MG: 1 INJECTION INTRAMUSCULAR; INTRAVENOUS; SUBCUTANEOUS at 18:58

## 2025-07-02 NOTE — RT PROTOCOL NOTE
RT Inhaler-Nebulizer Bronchodilator Protocol Note    There is a bronchodilator order in the chart from a provider indicating to follow the RT Bronchodilator Protocol and there is an “Initiate RT Inhaler-Nebulizer Bronchodilator Protocol” order as well (see protocol at bottom of note).    CXR Findings:  XR CHEST PORTABLE  Result Date: 6/30/2025  Impression: 1. No airspace disease. Electronically signed by Jagdish Cloud MD      The findings from the last RT Protocol Assessment were as follows:   History Pulmonary Disease: Chronic pulmonary disease  Respiratory Pattern: Regular pattern and RR 12-20 bpm  Breath Sounds: Slightly diminished and/or crackles  Cough: Strong, spontaneous, non-productive  Indication for Bronchodilator Therapy:    Bronchodilator Assessment Score: 4    Aerosolized bronchodilator medication orders have been revised according to the RT Inhaler-Nebulizer Bronchodilator Protocol below.    Respiratory Therapist to perform RT Therapy Protocol Assessment initially then follow the protocol.  Repeat RT Therapy Protocol Assessment PRN for score 0-3 or on second treatment, BID, and PRN for scores above 3.    No Indications - adjust the frequency to every 6 hours PRN wheezing or bronchospasm, if no treatments needed after 48 hours then discontinue using Per Protocol order mode.     If indication present, adjust the RT bronchodilator orders based on the Bronchodilator Assessment Score as indicated below.  Use Inhaler orders unless patient has one or more of the following: on home nebulizer, not able to hold breath for 10 seconds, is not alert and oriented, cannot activate and use MDI correctly, or respiratory rate 25 breaths per minute or more, then use the equivalent nebulizer order(s) with same Frequency and PRN reasons based on the score.  If a patient is on this medication at home then do not decrease Frequency below that used at home.    0-3 - enter or revise RT bronchodilator order(s) to equivalent

## 2025-07-02 NOTE — CARE COORDINATION
DISCHARGE PLANNING:  Chart reviewed.  Patient is from [facility] in an apartment alone. No current services. No DME use. She states her sister helps with transportation or she hires help.    Current discharge plan is to return back to her apartment.   She is requesting meds to beds (Dr. Solitario made aware).  She also needs a new nebulizer machine as her current one no longer works (she's had it for years).    Per Dr. Solitario, hopeful for discharge tomorrow.   Monitoring electrolytes.    CM team will continue to follow.  Radha Luis, RN Case Manager  [phone]

## 2025-07-02 NOTE — PROGRESS NOTES
Pharmacist Review and Automatic Dose Adjustment of Prophylactic Enoxaparin    The reviewing pharmacist has made an adjustment to the ordered enoxaparin dose or converted to UFH per the approved [facility] protocol and table as defined below.    Plan / Rationale: Based upon the patient's weight and renal function, the ordered dose of enoxaparin 40mg daily has been converted to 30 mg daily.    Please call with questions--  Zulma Eugene, PharmD, BCPS  Wireless: [phone]   7/2/2025 10:03 AM        Carla Galan is a 79 y.o. female.     Recent Labs     06/30/25  1838 07/01/25  0738 07/02/25  0450   CREATININE 1.5* 1.4* 1.6*       Estimated Creatinine Clearance: 26 mL/min (A) (based on SCr of 1.6 mg/dL (H)).    Recent Labs     07/01/25  0738 07/02/25  0450   HGB 12.2 12.3   HCT 36.3 37.6    363     No results for input(s): \"INR\" in the last 72 hours.    Height:   Ht Readings from Last 1 Encounters:   06/30/25 1.638 m (5' 4.5\")     Weight:  Wt Readings from Last 1 Encounters:   07/02/25 59.2 kg (130 lb 8.2 oz)

## 2025-07-02 NOTE — PROGRESS NOTES
on normal saline, will continue to monitor    Hyperkalemia  - Hyperkalemia cocktail given, will repeat check potassium at 2 PM    Possible discharge tomorrow morning if electrolytes corrected    Consults:      None      --------------------------------------------------    Medications:        Infusion Medications    sodium chloride      dextrose      dextrose      sodium chloride       Scheduled Medications    calcium gluconate  1,000 mg IntraVENous Once    insulin regular  10 Units IntraVENous Once    And    dextrose bolus  250 mL IntraVENous Once    insulin lispro  0-8 Units SubCUTAneous 4x Daily AC & HS    enoxaparin  40 mg SubCUTAneous Daily    ipratropium 0.5 mg-albuterol 2.5 mg  1 Dose Inhalation 4x Daily RT    amLODIPine  5 mg Oral Daily    brimonidine  1 drop Both Eyes TID    dorzolamide  1 drop Both Eyes TID    latanoprost  1 drop Both Eyes Daily    metoprolol succinate  50 mg Oral Daily    pravastatin  80 mg Oral Nightly    spironolactone  12.5 mg Oral QAM    sodium chloride flush  5-40 mL IntraVENous 2 times per day    methylPREDNISolone  40 mg IntraVENous Q6H    Followed by    [START ON 7/3/2025] predniSONE  40 mg Oral Daily     PRN Meds: glucose, dextrose bolus **OR** dextrose bolus, dextrose, glucose, dextrose bolus **OR** dextrose bolus, glucagon (rDNA), dextrose, sodium chloride flush, sodium chloride, ondansetron **OR** ondansetron, polyethylene glycol, acetaminophen **OR** acetaminophen, albuterol      Physical Exam Performed:      General appearance:  No apparent distress  Respiratory:  Normal respiratory effort without tachypnea. Scattered wheezing.  Cardiovascular:  Regular Rate w/ Regular rhythm.  Abdomen:  Soft, non-tender, non-distended. Bowel sounds normal  Musculoskeletal:  No edema  Neurologic:  Neurovascularly intact without focal sensory/motor deficits.  Psychiatric:  Oriented to Self    BP (!) 142/73   Pulse 83   Temp 97.6 °F (36.4 °C) (Oral)   Resp 18   Ht 1.638 m (5' 4.5\")   Wt  59.2 kg (130 lb 8.2 oz)   SpO2 98%   BMI 22.06 kg/m²     Telemetry:      Personally reviewed and interpreted telemetry (Rhythm Strip) on 7/2/2025.  Patient is currently ON tele demonstrating NSR w/ controlled rate.    Diet: ADULT DIET; Regular    DVT Prophylaxis: PPX dose LMWH    Code status: Full Code    PT/OT Eval Status: Not yet ordered    Multi-Disciplinary Rounds with Case Management completed on 7/2/2025 with the following recs:     Anticipated Discharge Location: TBD     Anticipated Discharge Day/Date:  TBD    Barriers to Discharge: Clinical Course    --------------------------------------------------    MDM (any 2 required for High level billing)    A. Problems (any 1)  [] Acute/Chronic Illness/injury posing ongoing threat to life and/or bodily function without ongoing treatment    [] Severe exacerbation of chronic illness    --------------------------------------------------  B. Risk of Treatment (any 1)    [] Drugs/treatments that require intensive monitoring for toxicity    [] IV ABX (Vancomycin, Aminoglycosides, etc)     [] Post-Cath/Contrast study requiring serial monitoring    [] IV Narcotic analgesia    [] Aggressive IV diuresis    [] Hypertonic Saline    [] Critical electrolyte abnormalities requiring IV replacement    [] Insulin - Scheduled/SSI or Insulin gtt    [] Anticoagulation (Heparin gtt or Coumadin - other anticoagulants in special circumstances)    [] Cardiac Medications (IV Amiodarone/Diltiazem, Tikosyn, etc)    [] Hemodialysis    [] Other -    [] Change in code status    [] Decision to escalate care    [] Major surgery/procedure with associated risk factors    --------------------------------------------------  C. Data (any 2)    [] Data Review (any 3)    [] Consultant/subspecialist notes from yesterday/today    [x] All available current labs reviewed interpreted for clinical significance    [x] Appropriate follow-up labs were ordered  [] Collateral history obtained     [] Independent

## 2025-07-02 NOTE — PLAN OF CARE
Problem: Chronic Conditions and Co-morbidities  Goal: Patient's chronic conditions and co-morbidity symptoms are monitored and maintained or improved  7/1/2025 2123 by Fatuma Mortensen, RN  Flowsheets (Taken 7/1/2025 0433 by Daniel Knowles RN)  Care Plan - Patient's Chronic Conditions and Co-Morbidity Symptoms are Monitored and Maintained or Improved:   Update acute care plan with appropriate goals if chronic or comorbid symptoms are exacerbated and prevent overall improvement and discharge   Collaborate with multidisciplinary team to address chronic and comorbid conditions and prevent exacerbation or deterioration   Monitor and assess patient's chronic conditions and comorbid symptoms for stability, deterioration, or improvement  7/1/2025 0834 by Tristian Russ RN  Outcome: Progressing     Problem: Discharge Planning  Goal: Discharge to home or other facility with appropriate resources  7/1/2025 0834 by Tristian Russ RN  Outcome: Progressing     Problem: Pain  Goal: Verbalizes/displays adequate comfort level or baseline comfort level  7/1/2025 0834 by Tristian Russ RN  Outcome: Progressing     Problem: Safety - Adult  Goal: Free from fall injury  Recent Flowsheet Documentation  Taken 7/1/2025 2119 by Fatuma Mortensen, RN  Free From Fall Injury: Instruct family/caregiver on patient safety  7/1/2025 0834 by Tristian Russ RN  Outcome: Progressing     Problem: Respiratory - Adult  Goal: Achieves optimal ventilation and oxygenation  7/1/2025 0834 by Tristian Russ, RN  Outcome: Progressing

## 2025-07-03 LAB
ANION GAP SERPL CALCULATED.3IONS-SCNC: 11 MMOL/L (ref 3–16)
BASOPHILS # BLD: 0 K/UL (ref 0–0.2)
BASOPHILS NFR BLD: 0.1 %
BUN SERPL-MCNC: 32 MG/DL (ref 7–20)
CALCIUM SERPL-MCNC: 8.7 MG/DL (ref 8.3–10.6)
CHLORIDE SERPL-SCNC: 100 MMOL/L (ref 99–110)
CO2 SERPL-SCNC: 20 MMOL/L (ref 21–32)
CREAT SERPL-MCNC: 1.3 MG/DL (ref 0.6–1.2)
DEPRECATED RDW RBC AUTO: 14.1 % (ref 12.4–15.4)
EOSINOPHIL # BLD: 0 K/UL (ref 0–0.6)
EOSINOPHIL NFR BLD: 0 %
GFR SERPLBLD CREATININE-BSD FMLA CKD-EPI: 42 ML/MIN/{1.73_M2}
GLUCOSE BLD-MCNC: 164 MG/DL (ref 70–99)
GLUCOSE BLD-MCNC: 173 MG/DL (ref 70–99)
GLUCOSE BLD-MCNC: 178 MG/DL (ref 70–99)
GLUCOSE BLD-MCNC: 180 MG/DL (ref 70–99)
GLUCOSE BLD-MCNC: 215 MG/DL (ref 70–99)
GLUCOSE SERPL-MCNC: 203 MG/DL (ref 70–99)
HCT VFR BLD AUTO: 34.2 % (ref 36–48)
HGB BLD-MCNC: 11.3 G/DL (ref 12–16)
LYMPHOCYTES # BLD: 1.2 K/UL (ref 1–5.1)
LYMPHOCYTES NFR BLD: 8.6 %
MAGNESIUM SERPL-MCNC: 2.21 MG/DL (ref 1.8–2.4)
MCH RBC QN AUTO: 26.6 PG (ref 26–34)
MCHC RBC AUTO-ENTMCNC: 33 G/DL (ref 31–36)
MCV RBC AUTO: 80.8 FL (ref 80–100)
MONOCYTES # BLD: 0.4 K/UL (ref 0–1.3)
MONOCYTES NFR BLD: 3.2 %
NEUTROPHILS # BLD: 12.2 K/UL (ref 1.7–7.7)
NEUTROPHILS NFR BLD: 88.1 %
PERFORMED ON: ABNORMAL
PLATELET # BLD AUTO: 315 K/UL (ref 135–450)
PMV BLD AUTO: 6.9 FL (ref 5–10.5)
POTASSIUM SERPL-SCNC: 4.4 MMOL/L (ref 3.5–5.1)
RBC # BLD AUTO: 4.24 M/UL (ref 4–5.2)
SODIUM SERPL-SCNC: 131 MMOL/L (ref 136–145)
WBC # BLD AUTO: 13.9 K/UL (ref 4–11)

## 2025-07-03 PROCEDURE — 36415 COLL VENOUS BLD VENIPUNCTURE: CPT

## 2025-07-03 PROCEDURE — 2580000003 HC RX 258: Performed by: STUDENT IN AN ORGANIZED HEALTH CARE EDUCATION/TRAINING PROGRAM

## 2025-07-03 PROCEDURE — 80048 BASIC METABOLIC PNL TOTAL CA: CPT

## 2025-07-03 PROCEDURE — 94640 AIRWAY INHALATION TREATMENT: CPT

## 2025-07-03 PROCEDURE — 85025 COMPLETE CBC W/AUTO DIFF WBC: CPT

## 2025-07-03 PROCEDURE — 6370000000 HC RX 637 (ALT 250 FOR IP): Performed by: INTERNAL MEDICINE

## 2025-07-03 PROCEDURE — 6370000000 HC RX 637 (ALT 250 FOR IP): Performed by: STUDENT IN AN ORGANIZED HEALTH CARE EDUCATION/TRAINING PROGRAM

## 2025-07-03 PROCEDURE — 6360000002 HC RX W HCPCS: Performed by: INTERNAL MEDICINE

## 2025-07-03 PROCEDURE — 2060000000 HC ICU INTERMEDIATE R&B

## 2025-07-03 PROCEDURE — 83735 ASSAY OF MAGNESIUM: CPT

## 2025-07-03 PROCEDURE — 2500000003 HC RX 250 WO HCPCS: Performed by: INTERNAL MEDICINE

## 2025-07-03 RX ORDER — ENOXAPARIN SODIUM 100 MG/ML
40 INJECTION SUBCUTANEOUS DAILY
Status: DISCONTINUED | OUTPATIENT
Start: 2025-07-03 | End: 2025-07-05 | Stop reason: HOSPADM

## 2025-07-03 RX ADMIN — SODIUM CHLORIDE, PRESERVATIVE FREE 10 ML: 5 INJECTION INTRAVENOUS at 20:13

## 2025-07-03 RX ADMIN — PRAVASTATIN SODIUM 80 MG: 80 TABLET ORAL at 20:11

## 2025-07-03 RX ADMIN — IPRATROPIUM BROMIDE AND ALBUTEROL SULFATE 1 DOSE: .5; 2.5 SOLUTION RESPIRATORY (INHALATION) at 07:41

## 2025-07-03 RX ADMIN — PREDNISONE 40 MG: 20 TABLET ORAL at 08:07

## 2025-07-03 RX ADMIN — INSULIN LISPRO 2 UNITS: 100 INJECTION, SOLUTION INTRAVENOUS; SUBCUTANEOUS at 12:06

## 2025-07-03 RX ADMIN — IPRATROPIUM BROMIDE AND ALBUTEROL SULFATE 1 DOSE: .5; 2.5 SOLUTION RESPIRATORY (INHALATION) at 12:08

## 2025-07-03 RX ADMIN — ALBUTEROL SULFATE 2.5 MG: 2.5 SOLUTION RESPIRATORY (INHALATION) at 01:21

## 2025-07-03 RX ADMIN — DORZOLAMIDE HYDROCHLORIDE 1 DROP: 20 SOLUTION/ DROPS OPHTHALMIC at 13:54

## 2025-07-03 RX ADMIN — INSULIN LISPRO 2 UNITS: 100 INJECTION, SOLUTION INTRAVENOUS; SUBCUTANEOUS at 16:57

## 2025-07-03 RX ADMIN — DORZOLAMIDE HYDROCHLORIDE 1 DROP: 20 SOLUTION/ DROPS OPHTHALMIC at 20:05

## 2025-07-03 RX ADMIN — LATANOPROST 1 DROP: 50 SOLUTION OPHTHALMIC at 20:05

## 2025-07-03 RX ADMIN — SODIUM CHLORIDE: 0.9 INJECTION, SOLUTION INTRAVENOUS at 14:41

## 2025-07-03 RX ADMIN — BRIMONIDINE TARTRATE 1 DROP: 2 SOLUTION OPHTHALMIC at 13:53

## 2025-07-03 RX ADMIN — SPIRONOLACTONE 12.5 MG: 25 TABLET ORAL at 08:06

## 2025-07-03 RX ADMIN — SODIUM CHLORIDE: 0.9 INJECTION, SOLUTION INTRAVENOUS at 06:20

## 2025-07-03 RX ADMIN — BRIMONIDINE TARTRATE 1 DROP: 2 SOLUTION OPHTHALMIC at 20:05

## 2025-07-03 RX ADMIN — AMLODIPINE BESYLATE 5 MG: 5 TABLET ORAL at 08:07

## 2025-07-03 RX ADMIN — BRIMONIDINE TARTRATE 1 DROP: 2 SOLUTION OPHTHALMIC at 08:12

## 2025-07-03 RX ADMIN — IPRATROPIUM BROMIDE AND ALBUTEROL SULFATE 1 DOSE: .5; 2.5 SOLUTION RESPIRATORY (INHALATION) at 21:12

## 2025-07-03 RX ADMIN — DORZOLAMIDE HYDROCHLORIDE 1 DROP: 20 SOLUTION/ DROPS OPHTHALMIC at 08:16

## 2025-07-03 RX ADMIN — SODIUM CHLORIDE: 0.9 INJECTION, SOLUTION INTRAVENOUS at 22:26

## 2025-07-03 RX ADMIN — METOPROLOL SUCCINATE 50 MG: 50 TABLET, EXTENDED RELEASE ORAL at 08:07

## 2025-07-03 RX ADMIN — ENOXAPARIN SODIUM 40 MG: 100 INJECTION SUBCUTANEOUS at 08:15

## 2025-07-03 RX ADMIN — IPRATROPIUM BROMIDE AND ALBUTEROL SULFATE 1 DOSE: .5; 2.5 SOLUTION RESPIRATORY (INHALATION) at 15:11

## 2025-07-03 ASSESSMENT — PAIN SCALES - GENERAL
PAINLEVEL_OUTOF10: 0

## 2025-07-03 NOTE — PLAN OF CARE
Problem: Chronic Conditions and Co-morbidities  Goal: Patient's chronic conditions and co-morbidity symptoms are monitored and maintained or improved  7/3/2025 1032 by Beverly Lloyd RN  Outcome: Progressing  Flowsheets (Taken 7/3/2025 0730)  Care Plan - Patient's Chronic Conditions and Co-Morbidity Symptoms are Monitored and Maintained or Improved:   Monitor and assess patient's chronic conditions and comorbid symptoms for stability, deterioration, or improvement   Collaborate with multidisciplinary team to address chronic and comorbid conditions and prevent exacerbation or deterioration   Update acute care plan with appropriate goals if chronic or comorbid symptoms are exacerbated and prevent overall improvement and discharge  7/3/2025 0605 by Sebas Villegas RN  Outcome: Progressing     Problem: Discharge Planning  Goal: Discharge to home or other facility with appropriate resources  7/3/2025 1032 by Beverly Lloyd RN  Outcome: Progressing  Flowsheets (Taken 7/3/2025 0730)  Discharge to home or other facility with appropriate resources: Refer to discharge planning if patient needs post-hospital services based on physician order or complex needs related to functional status, cognitive ability or social support system  7/3/2025 0605 by Sebas Villegas RN  Outcome: Progressing     Problem: Pain  Goal: Verbalizes/displays adequate comfort level or baseline comfort level  7/3/2025 1032 by Beverly Lloyd RN  Outcome: Progressing  7/3/2025 0605 by Sebas Villegas RN  Outcome: Progressing     Problem: Safety - Adult  Goal: Free from fall injury  7/3/2025 1032 by Beverly Lloyd RN  Outcome: Progressing  7/3/2025 0605 by Sebas Villegas RN  Outcome: Progressing     Problem: Respiratory - Adult  Goal: Achieves optimal ventilation and oxygenation  7/3/2025 1032 by Beverly Lloyd RN  Outcome: Progressing  Flowsheets (Taken 7/3/2025 0730)  Achieves optimal ventilation and oxygenation:   Assess for changes in mentation and behavior

## 2025-07-03 NOTE — PROGRESS NOTES
Pharmacist Review and Automatic Dose Adjustment of Prophylactic Enoxaparin    The reviewing pharmacist has made an adjustment to the ordered enoxaparin dose or converted to UFH per the approved [facility] protocol and table as defined below.    Plan / Rationale: Based upon the patient's weight and renal function, the ordered dose of enoxaparin 30 mg daily has been converted to 40 mg daily.    Please call with questions--  Zulma Eugene, PharmD, BCPS  Wireless: [phone]   7/3/2025 8:01 AM        Carla Galan is a 79 y.o. female.     Recent Labs     07/01/25  0738 07/02/25  0450 07/03/25  0606   CREATININE 1.4* 1.6* 1.3*       Estimated Creatinine Clearance: 32 mL/min (A) (based on SCr of 1.3 mg/dL (H)).    Recent Labs     07/02/25  0450 07/03/25  0606   HGB 12.3 11.3*   HCT 37.6 34.2*    315     No results for input(s): \"INR\" in the last 72 hours.    Height:   Ht Readings from Last 1 Encounters:   06/30/25 1.638 m (5' 4.5\")     Weight:  Wt Readings from Last 1 Encounters:   07/03/25 60.9 kg (134 lb 4.2 oz)

## 2025-07-03 NOTE — PLAN OF CARE
Problem: Chronic Conditions and Co-morbidities  Goal: Patient's chronic conditions and co-morbidity symptoms are monitored and maintained or improved  Outcome: Progressing     Problem: Discharge Planning  Goal: Discharge to home or other facility with appropriate resources  Outcome: Progressing     Problem: Pain  Goal: Verbalizes/displays adequate comfort level or baseline comfort level  Outcome: Progressing     Problem: Safety - Adult  Goal: Free from fall injury  Outcome: Progressing     Problem: Respiratory - Adult  Goal: Achieves optimal ventilation and oxygenation  Outcome: Progressing     Problem: ABCDS Injury Assessment  Goal: Absence of physical injury  Outcome: Progressing

## 2025-07-03 NOTE — RT PROTOCOL NOTE
RT Nebulizer Bronchodilator Protocol Note    There is a bronchodilator order in the chart from a provider indicating to follow the RT Bronchodilator Protocol and there is an “Initiate RT Bronchodilator Protocol” order as well (see protocol at bottom of note).    CXR Findings:  No results found.    The findings from the last RT Protocol Assessment were as follows:  Smoking: Chronic pulmonary disease  Respiratory Pattern: Regular pattern and RR 12-20 bpm  Breath Sounds: Slightly diminished and/or crackles  Cough: Strong, spontaneous, non-productive  Indication for Bronchodilator Therapy:    Bronchodilator Assessment Score: 4  Pt takes tx frequently at home.  Aerosolized bronchodilator medication orders have been revised according to the RT Nebulizer Bronchodilator Protocol below.    Respiratory Therapist to perform RT Therapy Protocol Assessment initially then follow the protocol.  Repeat RT Therapy Protocol Assessment PRN for score 0-3 or on second treatment, BID, and PRN for scores above 3.    No Indications - adjust the frequency to every 6 hours PRN wheezing or bronchospasm, if no treatments needed after 48 hours then discontinue using Per Protocol order mode.     If indication present, adjust the RT bronchodilator orders based on the Bronchodilator Assessment Score as indicated below.  If a patient is on this medication at home then do not decrease Frequency below that used at home.    0-3 - enter or revise RT bronchodilator order(s) to equivalent RT Bronchodilator order with Frequency of every 4 hours PRN for wheezing or increased work of breathing using Per Protocol order mode.       4-6 - enter or revise RT Bronchodilator order(s) to two equivalent RT bronchodilator orders with one order with BID Frequency and one order with Frequency of every 4 hours PRN wheezing or increased work of breathing using Per Protocol order mode.         7-10 - enter or revise RT Bronchodilator order(s) to two equivalent RT  bronchodilator orders with one order with TID Frequency and one order with Frequency of every 4 hours PRN wheezing or increased work of breathing using Per Protocol order mode.       11-13 - enter or revise RT Bronchodilator order(s) to one equivalent RT bronchodilator order with QID Frequency and an Albuterol order with Frequency of every 4 hours PRN wheezing or increased work of breathing using Per Protocol order mode.      Greater than 13 - enter or revise RT Bronchodilator order(s) to one equivalent RT bronchodilator order with every 4 hours Frequency and an Albuterol order with Frequency of every 2 hours PRN wheezing or increased work of breathing using Per Protocol order mode.     RT to enter RT Home Evaluation for COPD & MDI Assessment order using Per Protocol order mode.    Electronically signed by Hima Dixon RCP on 7/3/2025 at 7:46 AM

## 2025-07-03 NOTE — PROGRESS NOTES
[facility] Medicine Progress Note  V 5.17      Date of Admission: 6/30/2025    Hospital Day: 4      Chief Admission Complaint:  Shortness of breath     Subjective: Patient was seen and examined at the bedside.Patient reported that she had self resolving episode of lightheadedness when she woke up which lasted for few seconds. Other than that she is feeling fine. No new concerns or complaints at this moment  No acute events overnight    Presenting Admission History:       79 y.o. female with PMHx significant for moderate persistent asthma, hypertension, hyperlipidemia, diabetes mellitus, CKD stage III, and gout, who presented to ED with a complaint of shortness of breath.  Patient has been short of breath for the last couple of days associated with wheezing and chest tightness and dry cough.  In ED patient was found to be in respiratory distress with mild wheezing.  Patient received continuous nebulizer treatment prior to that patient received IV steroids by EMS.  Patient was noted to be hypoxic requiring supplemental oxygen via nasal cannula and currently on 2 L/min.  Patient not on home oxygen at baseline.  Labs remarkable for WBC count of 20K, creatinine 1.5 which is her baseline troponin 29 and BNP of 221.  Chest x-ray shows no evidence of acute changes.  Patient is currently being admitted under inpatient status for further management evaluation.     Assessment/Plan:      Moderate persistent asthma with acute exacerbation  Acute hypoxic and hypercapnic respiratory failure.  Leukocytosis  Essential hypertension.  Hyperlipidemia.  Type 2 diabetes mellitus.  CKD stage IIIa.  Glaucoma     Plan:    Continue scheduled DuoNeb.  As needed albuterol.  IV Solu-Medrol for 24 hours with plans to transition to p.o. prednisone from 07/03/25/ -07/06/25.  Hold off on antibiotics for now.  Continue supplemental oxygen via nasal cannula.  Wean oxygen as tolerated.  Repeat labs in the morning and monitor WBC count.  Hold oral  follow-up labs were ordered  [] Collateral history obtained     [] Independent Interpretation of tests (any 1)    [] Telemetry (Rhythm Strip) personally reviewed and interpreted        [] Imaging personally reviewed and interpreted     [x] Discussion (any 1)  [x] Multi-Disciplinary Rounds with Case Management  [] Discussed management of the case with           Labs:  Personally reviewed on 7/3/2025 and interpreted for clinical significance as documented above.     Recent Labs     07/01/25  0738 07/02/25  0450 07/03/25  0606   WBC 13.3* 23.7* 13.9*   HGB 12.2 12.3 11.3*   HCT 36.3 37.6 34.2*    363 315     Recent Labs     07/01/25  0738 07/02/25 0450 07/02/25  1748 07/03/25  0606   * 130*  --  131*   K 5.0 5.3* 4.8 4.4   CL 96* 95*  --  100   CO2 19* 22  --  20*   BUN 23* 32*  --  32*   CREATININE 1.4* 1.6*  --  1.3*   CALCIUM 9.9 9.8  --  8.7   MG  --   --   --  2.21     Recent Labs     06/30/25  1838 06/30/25  2010   PROBNP 221  --    TROPHS 29* 34*     No results for input(s): \"LABA1C\" in the last 72 hours.  No results for input(s): \"AST\", \"ALT\", \"BILIDIR\", \"BILITOT\", \"ALKPHOS\" in the last 72 hours.  No results for input(s): \"INR\", \"LACTA\", \"TSH\" in the last 72 hours.    Urine Cultures: No results found for: \"LABURIN\"  Blood Cultures: No results found for: \"BC\"  No results found for: \"BLOODCULT2\"  Organism: No results found for: \"ORG\"      Angelito Solitario MD

## 2025-07-03 NOTE — PROGRESS NOTES
Pt A&Ox 4 on RA. AM assessment and vitals completed and put into flowsheets. AM medications given with no s/s of aspiration. Patient takes pills whole with water. Pt with no questions or concerns voiced to RN at this time. Fall precautions in place and call light within reach.   Vitals:    07/03/25 0323 07/03/25 0617 07/03/25 0730 07/03/25 0742   BP: 133/69  127/70    Pulse: 69  78    Resp: 18  16 16   Temp: 97.5 °F (36.4 °C)  97.1 °F (36.2 °C)    TempSrc: Oral  Oral    SpO2: 95%  100% 97%   Weight:  60.9 kg (134 lb 4.2 oz)     Height:

## 2025-07-03 NOTE — CARE COORDINATION
DISCHARGE PLANNING:  Chart reviewed.  Patient is from [facility] in an apartment alone. No current services. No DME use. She states her sister helps with transportation or she hires help.     Current discharge plan is to return back to her apartment.   She is requesting meds to beds if able to send today, if not then they'll be sent to her normal pharmacy.  Order in place for a nebulizer so that covering weekend CM team can obtain prior to discharge.    Per Dr. Solitario, still monitoring sodium today.  Hopeful for discharge tomorrow.    CM team will continue to follow.  Radha Luis, RN Case Manager  [phone]

## 2025-07-04 PROBLEM — E87.1 HYPONATREMIA: Status: ACTIVE | Noted: 2025-07-04

## 2025-07-04 PROBLEM — J45.901 ASTHMA EXACERBATION ATTACKS: Status: RESOLVED | Noted: 2025-06-30 | Resolved: 2025-07-04

## 2025-07-04 PROBLEM — R73.9 HYPERGLYCEMIA: Status: ACTIVE | Noted: 2025-07-04

## 2025-07-04 LAB
ANION GAP SERPL CALCULATED.3IONS-SCNC: 10 MMOL/L (ref 3–16)
BASOPHILS # BLD: 0 K/UL (ref 0–0.2)
BASOPHILS NFR BLD: 0 %
BUN SERPL-MCNC: 23 MG/DL (ref 7–20)
CALCIUM SERPL-MCNC: 8.5 MG/DL (ref 8.3–10.6)
CHLORIDE SERPL-SCNC: 104 MMOL/L (ref 99–110)
CO2 SERPL-SCNC: 17 MMOL/L (ref 21–32)
CREAT SERPL-MCNC: 1.1 MG/DL (ref 0.6–1.2)
DEPRECATED RDW RBC AUTO: 13.9 % (ref 12.4–15.4)
EOSINOPHIL # BLD: 0 K/UL (ref 0–0.6)
EOSINOPHIL NFR BLD: 0 %
GFR SERPLBLD CREATININE-BSD FMLA CKD-EPI: 51 ML/MIN/{1.73_M2}
GLUCOSE BLD-MCNC: 131 MG/DL (ref 70–99)
GLUCOSE BLD-MCNC: 147 MG/DL (ref 70–99)
GLUCOSE BLD-MCNC: 163 MG/DL (ref 70–99)
GLUCOSE BLD-MCNC: 176 MG/DL (ref 70–99)
GLUCOSE SERPL-MCNC: 206 MG/DL (ref 70–99)
HCT VFR BLD AUTO: 35.8 % (ref 36–48)
HGB BLD-MCNC: 11.7 G/DL (ref 12–16)
LYMPHOCYTES # BLD: 1.7 K/UL (ref 1–5.1)
LYMPHOCYTES NFR BLD: 11.4 %
MCH RBC QN AUTO: 27.1 PG (ref 26–34)
MCHC RBC AUTO-ENTMCNC: 32.7 G/DL (ref 31–36)
MCV RBC AUTO: 83 FL (ref 80–100)
MONOCYTES # BLD: 1.1 K/UL (ref 0–1.3)
MONOCYTES NFR BLD: 7.2 %
NEUTROPHILS # BLD: 11.9 K/UL (ref 1.7–7.7)
NEUTROPHILS NFR BLD: 81.4 %
PERFORMED ON: ABNORMAL
PLATELET # BLD AUTO: 296 K/UL (ref 135–450)
PMV BLD AUTO: 7.2 FL (ref 5–10.5)
POTASSIUM SERPL-SCNC: 4.4 MMOL/L (ref 3.5–5.1)
RBC # BLD AUTO: 4.31 M/UL (ref 4–5.2)
REASON FOR REJECTION: NORMAL
REJECTED TEST: NORMAL
SODIUM SERPL-SCNC: 131 MMOL/L (ref 136–145)
WBC # BLD AUTO: 14.6 K/UL (ref 4–11)

## 2025-07-04 PROCEDURE — 6360000002 HC RX W HCPCS: Performed by: INTERNAL MEDICINE

## 2025-07-04 PROCEDURE — 6370000000 HC RX 637 (ALT 250 FOR IP): Performed by: INTERNAL MEDICINE

## 2025-07-04 PROCEDURE — 94640 AIRWAY INHALATION TREATMENT: CPT

## 2025-07-04 PROCEDURE — 97166 OT EVAL MOD COMPLEX 45 MIN: CPT

## 2025-07-04 PROCEDURE — 97530 THERAPEUTIC ACTIVITIES: CPT

## 2025-07-04 PROCEDURE — 2500000003 HC RX 250 WO HCPCS: Performed by: INTERNAL MEDICINE

## 2025-07-04 PROCEDURE — 36415 COLL VENOUS BLD VENIPUNCTURE: CPT

## 2025-07-04 PROCEDURE — 80048 BASIC METABOLIC PNL TOTAL CA: CPT

## 2025-07-04 PROCEDURE — 97535 SELF CARE MNGMENT TRAINING: CPT

## 2025-07-04 PROCEDURE — 6370000000 HC RX 637 (ALT 250 FOR IP): Performed by: STUDENT IN AN ORGANIZED HEALTH CARE EDUCATION/TRAINING PROGRAM

## 2025-07-04 PROCEDURE — 94761 N-INVAS EAR/PLS OXIMETRY MLT: CPT

## 2025-07-04 PROCEDURE — 2060000000 HC ICU INTERMEDIATE R&B

## 2025-07-04 PROCEDURE — 2580000003 HC RX 258: Performed by: STUDENT IN AN ORGANIZED HEALTH CARE EDUCATION/TRAINING PROGRAM

## 2025-07-04 PROCEDURE — 85025 COMPLETE CBC W/AUTO DIFF WBC: CPT

## 2025-07-04 RX ORDER — SODIUM CHLORIDE 1 G/1
1 TABLET ORAL
Status: DISCONTINUED | OUTPATIENT
Start: 2025-07-04 | End: 2025-07-05 | Stop reason: HOSPADM

## 2025-07-04 RX ADMIN — ENOXAPARIN SODIUM 40 MG: 100 INJECTION SUBCUTANEOUS at 08:43

## 2025-07-04 RX ADMIN — LATANOPROST 1 DROP: 50 SOLUTION OPHTHALMIC at 21:59

## 2025-07-04 RX ADMIN — SODIUM CHLORIDE: 0.9 INJECTION, SOLUTION INTRAVENOUS at 06:40

## 2025-07-04 RX ADMIN — IPRATROPIUM BROMIDE AND ALBUTEROL SULFATE 1 DOSE: .5; 2.5 SOLUTION RESPIRATORY (INHALATION) at 16:27

## 2025-07-04 RX ADMIN — SODIUM CHLORIDE 1 G: 1 TABLET ORAL at 18:17

## 2025-07-04 RX ADMIN — BRIMONIDINE TARTRATE 1 DROP: 2 SOLUTION OPHTHALMIC at 21:59

## 2025-07-04 RX ADMIN — PREDNISONE 40 MG: 20 TABLET ORAL at 08:44

## 2025-07-04 RX ADMIN — SODIUM CHLORIDE: 0.9 INJECTION, SOLUTION INTRAVENOUS at 22:54

## 2025-07-04 RX ADMIN — DORZOLAMIDE HYDROCHLORIDE 1 DROP: 20 SOLUTION/ DROPS OPHTHALMIC at 21:59

## 2025-07-04 RX ADMIN — BRIMONIDINE TARTRATE 1 DROP: 2 SOLUTION OPHTHALMIC at 08:48

## 2025-07-04 RX ADMIN — BRIMONIDINE TARTRATE 1 DROP: 2 SOLUTION OPHTHALMIC at 13:55

## 2025-07-04 RX ADMIN — IPRATROPIUM BROMIDE AND ALBUTEROL SULFATE 1 DOSE: .5; 2.5 SOLUTION RESPIRATORY (INHALATION) at 20:23

## 2025-07-04 RX ADMIN — METOPROLOL SUCCINATE 50 MG: 50 TABLET, EXTENDED RELEASE ORAL at 08:44

## 2025-07-04 RX ADMIN — SPIRONOLACTONE 12.5 MG: 25 TABLET ORAL at 08:43

## 2025-07-04 RX ADMIN — SODIUM CHLORIDE, PRESERVATIVE FREE 10 ML: 5 INJECTION INTRAVENOUS at 08:48

## 2025-07-04 RX ADMIN — IPRATROPIUM BROMIDE AND ALBUTEROL SULFATE 1 DOSE: .5; 2.5 SOLUTION RESPIRATORY (INHALATION) at 12:26

## 2025-07-04 RX ADMIN — SODIUM CHLORIDE: 0.9 INJECTION, SOLUTION INTRAVENOUS at 15:23

## 2025-07-04 RX ADMIN — DORZOLAMIDE HYDROCHLORIDE 1 DROP: 20 SOLUTION/ DROPS OPHTHALMIC at 13:55

## 2025-07-04 RX ADMIN — SODIUM CHLORIDE, PRESERVATIVE FREE 10 ML: 5 INJECTION INTRAVENOUS at 21:57

## 2025-07-04 RX ADMIN — DORZOLAMIDE HYDROCHLORIDE 1 DROP: 20 SOLUTION/ DROPS OPHTHALMIC at 08:48

## 2025-07-04 RX ADMIN — AMLODIPINE BESYLATE 5 MG: 5 TABLET ORAL at 08:44

## 2025-07-04 RX ADMIN — PRAVASTATIN SODIUM 80 MG: 80 TABLET ORAL at 21:57

## 2025-07-04 RX ADMIN — ALBUTEROL SULFATE 2.5 MG: 2.5 SOLUTION RESPIRATORY (INHALATION) at 03:32

## 2025-07-04 ASSESSMENT — PAIN SCALES - GENERAL
PAINLEVEL_OUTOF10: 0

## 2025-07-04 NOTE — PROGRESS NOTES
Occupational Therapy  Facility/Department: [address]  Occupational Therapy Initial Assessment    Name: Carla Galan  : 1945  MRN: [id number]  Date of Service: 2025    Discharge Recommendations:  Home with assist PRN, 24 hour supervision or assist, Home with Home health OT  OT Equipment Recommendations  Equipment Needed: Yes  Mobility Devices: Walker  Walker: Rolling       Patient Diagnosis(es): The primary encounter diagnosis was Severe persistent asthma with exacerbation (HCC). A diagnosis of Hypoxia was also pertinent to this visit.  Past Medical History:  has a past medical history of Asthma, Chronic kidney disease, Glaucoma, Hyperlipidemia, Hypertension, and Type 2 diabetes mellitus without complication (HCC).  Past Surgical History:  has a past surgical history that includes Hysterectomy, total abdominal (); Upper gastrointestinal endoscopy (2017); Colonoscopy (2017); and Breast biopsy.    Treatment Diagnosis: functional mobility/ADL deficit      Assessment  Performance deficits / Impairments: Decreased functional mobility ;Decreased safe awareness;Decreased balance;Decreased cognition;Decreased ADL status;Decreased endurance  Assessment: Pt is a 79 y.o. female presenting below [facility]. Pt reports she lives alone and is independent at baseline. Pt currently unsteady with ambulation requiring min assistance without AD which improved to CGA with RW. Pt completed toileting and grooming in a stance with CGA for balance. . Pt anxious regarding current deficits however receptive to education on POC and HHOT. Pt good to DC home with initial 24 hr assistance and HHOT for improved balance and safety needed for ADLs. OT will continue to follow while admitted.  Treatment Diagnosis: functional mobility/ADL deficit  Prognosis: Good  Decision Making: Medium Complexity  Activity Tolerance  Activity Tolerance: Patient Tolerated treatment well  Activity Tolerance Comments: Pt

## 2025-07-04 NOTE — PLAN OF CARE
Problem: Pain  Goal: Verbalizes/displays adequate comfort level or baseline comfort level  Outcome: Progressing     Problem: Safety - Adult  Goal: Free from fall injury  Outcome: Progressing  Pt is a High fall risk.   Explained fall risk precautions to pt and rationale behind their use to keep the patient safe. Belongings are in reach. Pt encouraged to notify staff for any and all assistance. Staff present in tx suite throughout entirety of pts treatment to monitor and protect from falls.  Assistance provided when ambulating to restroom utilizing Stay With Me.       Problem: Respiratory - Adult  Goal: Achieves optimal ventilation and oxygenation  Outcome: Progressing  Patient tolerated room air this shift

## 2025-07-04 NOTE — PROGRESS NOTES
4 Eyes Skin Assessment     NAME:  Carla Galan  YOB: 1945  MEDICAL RECORD NUMBER:  [id number]    The patient is being assessed for  Transfer to New Unit    I agree that at least one RN has performed a thorough Head to Toe Skin Assessment on the patient. ALL assessment sites listed below have been assessed.      Areas assessed by both nurses:    Head, Face, Ears, Shoulders, Back, Chest, Arms, Elbows, Hands, Sacrum. Buttock, Coccyx, Ischium, Legs. Feet and Heels, and Under Medical Devices         Does the Patient have a Wound? No noted wound(s)       Ruddy Prevention initiated by RN: No  Wound Care Orders initiated by RN: No    Pressure Injury (Stage 1,2,3,4, Unstageable, DTI, NWPT, and Complex wounds) if present, place Wound referral order by RN under : No    New Ostomies, if present place, Ostomy referral order under : No     Nurse 1 eSignature: Electronically signed by Anh Velez RN on 7/4/25 at 5:07 AM EDT    **SHARE this note so that the co-signing nurse can place an eSignature**    Nurse 2 eSignature: {Esignature:[id number]}

## 2025-07-04 NOTE — PROGRESS NOTES
Pt arrived on unit. VSS. Oriented pt and family to room. Educated pt on importance of calling for help when going to the bathroom. Bed alarm activated. Call light within reach. Denies further needs at this time.

## 2025-07-04 NOTE — PLAN OF CARE
Patient eager to go home. Sodium recheck and still at 131. Patient educated about sodium. Patient VSS throughout the day. Pt only complaint was feeling like her throat was swelling and needing breathing treatments. Report given to staff RN.     Problem: Chronic Conditions and Co-morbidities  Goal: Patient's chronic conditions and co-morbidity symptoms are monitored and maintained or improved  7/4/2025 1817 by Glen Moon RN  Outcome: Progressing  Flowsheets (Taken 7/4/2025 0800)  Care Plan - Patient's Chronic Conditions and Co-Morbidity Symptoms are Monitored and Maintained or Improved:   Monitor and assess patient's chronic conditions and comorbid symptoms for stability, deterioration, or improvement   Collaborate with multidisciplinary team to address chronic and comorbid conditions and prevent exacerbation or deterioration   Update acute care plan with appropriate goals if chronic or comorbid symptoms are exacerbated and prevent overall improvement and discharge  7/4/2025 0506 by Anh Velez RN  Outcome: Progressing     Problem: Discharge Planning  Goal: Discharge to home or other facility with appropriate resources  7/4/2025 1817 by Glen Moon RN  Outcome: Progressing  Flowsheets (Taken 7/4/2025 0800)  Discharge to home or other facility with appropriate resources:   Identify barriers to discharge with patient and caregiver   Arrange for needed discharge resources and transportation as appropriate   Identify discharge learning needs (meds, wound care, etc)   Refer to discharge planning if patient needs post-hospital services based on physician order or complex needs related to functional status, cognitive ability or social support system  7/4/2025 0506 by Anh Velez RN  Outcome: Progressing     Problem: Pain  Goal: Verbalizes/displays adequate comfort level or baseline comfort level  7/4/2025 1817 by Glen Moon RN  Outcome: Progressing  Flowsheets (Taken 7/4/2025

## 2025-07-05 VITALS
HEIGHT: 65 IN | RESPIRATION RATE: 18 BRPM | WEIGHT: 140.2 LBS | TEMPERATURE: 97.7 F | DIASTOLIC BLOOD PRESSURE: 69 MMHG | OXYGEN SATURATION: 99 % | SYSTOLIC BLOOD PRESSURE: 138 MMHG | HEART RATE: 61 BPM | BODY MASS INDEX: 23.36 KG/M2

## 2025-07-05 LAB
ANION GAP SERPL CALCULATED.3IONS-SCNC: 9 MMOL/L (ref 3–16)
BASOPHILS # BLD: 0 K/UL (ref 0–0.2)
BASOPHILS NFR BLD: 0.1 %
BUN SERPL-MCNC: 19 MG/DL (ref 7–20)
CALCIUM SERPL-MCNC: 8.6 MG/DL (ref 8.3–10.6)
CHLORIDE SERPL-SCNC: 106 MMOL/L (ref 99–110)
CO2 SERPL-SCNC: 21 MMOL/L (ref 21–32)
CREAT SERPL-MCNC: 1.1 MG/DL (ref 0.6–1.2)
DEPRECATED RDW RBC AUTO: 13.9 % (ref 12.4–15.4)
EOSINOPHIL # BLD: 0 K/UL (ref 0–0.6)
EOSINOPHIL NFR BLD: 0.1 %
GFR SERPLBLD CREATININE-BSD FMLA CKD-EPI: 51 ML/MIN/{1.73_M2}
GLUCOSE BLD-MCNC: 88 MG/DL (ref 70–99)
GLUCOSE SERPL-MCNC: 90 MG/DL (ref 70–99)
HCT VFR BLD AUTO: 36.2 % (ref 36–48)
HGB BLD-MCNC: 12 G/DL (ref 12–16)
LYMPHOCYTES # BLD: 2.6 K/UL (ref 1–5.1)
LYMPHOCYTES NFR BLD: 21.9 %
MCH RBC QN AUTO: 27.4 PG (ref 26–34)
MCHC RBC AUTO-ENTMCNC: 33.3 G/DL (ref 31–36)
MCV RBC AUTO: 82.4 FL (ref 80–100)
MONOCYTES # BLD: 1.1 K/UL (ref 0–1.3)
MONOCYTES NFR BLD: 9.5 %
NEUTROPHILS # BLD: 8 K/UL (ref 1.7–7.7)
NEUTROPHILS NFR BLD: 68.4 %
PERFORMED ON: NORMAL
PLATELET # BLD AUTO: 320 K/UL (ref 135–450)
PMV BLD AUTO: 7 FL (ref 5–10.5)
POTASSIUM SERPL-SCNC: 4.1 MMOL/L (ref 3.5–5.1)
RBC # BLD AUTO: 4.39 M/UL (ref 4–5.2)
SODIUM SERPL-SCNC: 136 MMOL/L (ref 136–145)
WBC # BLD AUTO: 11.7 K/UL (ref 4–11)

## 2025-07-05 PROCEDURE — 2580000003 HC RX 258: Performed by: STUDENT IN AN ORGANIZED HEALTH CARE EDUCATION/TRAINING PROGRAM

## 2025-07-05 PROCEDURE — 6370000000 HC RX 637 (ALT 250 FOR IP): Performed by: STUDENT IN AN ORGANIZED HEALTH CARE EDUCATION/TRAINING PROGRAM

## 2025-07-05 PROCEDURE — 36415 COLL VENOUS BLD VENIPUNCTURE: CPT

## 2025-07-05 PROCEDURE — 94640 AIRWAY INHALATION TREATMENT: CPT

## 2025-07-05 PROCEDURE — 6370000000 HC RX 637 (ALT 250 FOR IP): Performed by: INTERNAL MEDICINE

## 2025-07-05 PROCEDURE — 85025 COMPLETE CBC W/AUTO DIFF WBC: CPT

## 2025-07-05 PROCEDURE — 80048 BASIC METABOLIC PNL TOTAL CA: CPT

## 2025-07-05 PROCEDURE — 6360000002 HC RX W HCPCS: Performed by: INTERNAL MEDICINE

## 2025-07-05 PROCEDURE — 2500000003 HC RX 250 WO HCPCS: Performed by: INTERNAL MEDICINE

## 2025-07-05 RX ORDER — IPRATROPIUM BROMIDE AND ALBUTEROL SULFATE 2.5; .5 MG/3ML; MG/3ML
1 SOLUTION RESPIRATORY (INHALATION)
Status: DISCONTINUED | OUTPATIENT
Start: 2025-07-05 | End: 2025-07-05 | Stop reason: HOSPADM

## 2025-07-05 RX ADMIN — DORZOLAMIDE HYDROCHLORIDE 1 DROP: 20 SOLUTION/ DROPS OPHTHALMIC at 08:24

## 2025-07-05 RX ADMIN — SODIUM CHLORIDE 1 G: 1 TABLET ORAL at 08:20

## 2025-07-05 RX ADMIN — BRIMONIDINE TARTRATE 1 DROP: 2 SOLUTION OPHTHALMIC at 08:24

## 2025-07-05 RX ADMIN — ENOXAPARIN SODIUM 40 MG: 100 INJECTION SUBCUTANEOUS at 08:21

## 2025-07-05 RX ADMIN — AMLODIPINE BESYLATE 5 MG: 5 TABLET ORAL at 08:21

## 2025-07-05 RX ADMIN — SODIUM CHLORIDE, PRESERVATIVE FREE 10 ML: 5 INJECTION INTRAVENOUS at 08:24

## 2025-07-05 RX ADMIN — SODIUM CHLORIDE: 0.9 INJECTION, SOLUTION INTRAVENOUS at 06:09

## 2025-07-05 RX ADMIN — SPIRONOLACTONE 12.5 MG: 25 TABLET ORAL at 08:20

## 2025-07-05 RX ADMIN — IPRATROPIUM BROMIDE AND ALBUTEROL SULFATE 1 DOSE: .5; 2.5 SOLUTION RESPIRATORY (INHALATION) at 07:54

## 2025-07-05 RX ADMIN — METOPROLOL SUCCINATE 50 MG: 50 TABLET, EXTENDED RELEASE ORAL at 08:20

## 2025-07-05 RX ADMIN — PREDNISONE 40 MG: 20 TABLET ORAL at 08:21

## 2025-07-05 ASSESSMENT — PAIN SCALES - GENERAL
PAINLEVEL_OUTOF10: 0

## 2025-07-05 NOTE — RT PROTOCOL NOTE
RT Inhaler-Nebulizer Bronchodilator Protocol Note    There is a bronchodilator order in the chart from a provider indicating to follow the RT Bronchodilator Protocol and there is an “Initiate RT Inhaler-Nebulizer Bronchodilator Protocol” order as well (see protocol at bottom of note).    CXR Findings:  No results found.    The findings from the last RT Protocol Assessment were as follows:   History Pulmonary Disease: Chronic pulmonary disease  Respiratory Pattern: Regular pattern and RR 12-20 bpm  Breath Sounds: Slightly diminished and/or crackles  Cough: Strong, spontaneous, non-productive  Indication for Bronchodilator Therapy:    Bronchodilator Assessment Score: 4  Will continue with BID.    Aerosolized bronchodilator medication orders have been revised according to the RT Inhaler-Nebulizer Bronchodilator Protocol below.    Respiratory Therapist to perform RT Therapy Protocol Assessment initially then follow the protocol.  Repeat RT Therapy Protocol Assessment PRN for score 0-3 or on second treatment, BID, and PRN for scores above 3.    No Indications - adjust the frequency to every 6 hours PRN wheezing or bronchospasm, if no treatments needed after 48 hours then discontinue using Per Protocol order mode.     If indication present, adjust the RT bronchodilator orders based on the Bronchodilator Assessment Score as indicated below.  Use Inhaler orders unless patient has one or more of the following: on home nebulizer, not able to hold breath for 10 seconds, is not alert and oriented, cannot activate and use MDI correctly, or respiratory rate 25 breaths per minute or more, then use the equivalent nebulizer order(s) with same Frequency and PRN reasons based on the score.  If a patient is on this medication at home then do not decrease Frequency below that used at home.    0-3 - enter or revise RT bronchodilator order(s) to equivalent RT Bronchodilator order with Frequency of every 4 hours PRN for wheezing or

## 2025-07-05 NOTE — DISCHARGE INSTRUCTIONS
Please get lab test BMP done in 3 days and then follow-up with your primary care physician within a week.

## 2025-07-05 NOTE — DISCHARGE SUMMARY
Hospital Medicine Discharge Summary    Patient: Carla Galan   : 1945     Hospital:  [facility]  Admit Date: 2025   Discharge Date:   25   Disposition:  Home   Code status:  Full  Condition at Discharge: Stable  Primary Care Provider: Marjorie Bond DO    Admitting Provider: Akanksha Sow MD  Discharge Provider: Angelito Solitario MD     Discharge Diagnoses:  Moderate persistent asthma with acute exacerbation     Active Hospital Problems    Diagnosis     Hyponatremia [E87.1]     Hyperglycemia [R73.9]        Presenting Admission History:      79 y.o. female with PMHx significant for moderate persistent asthma, hypertension, hyperlipidemia, diabetes mellitus, CKD stage III, and gout, who presented to ED with a complaint of shortness of breath. Patient has been short of breath for the last couple of days associated with wheezing and chest tightness and dry cough. In ED patient was found to be in respiratory distress with mild wheezing. Patient received continuous nebulizer treatment prior to that patient received IV steroids by EMS. Patient was noted to be hypoxic requiring supplemental oxygen via nasal cannula and currently on 2 L/min. Patient not on home oxygen at baseline. Labs remarkable for WBC count of 20K, creatinine 1.5 which is her baseline troponin 29 and BNP of 221. Chest x-ray shows no evidence of acute changes. Patient is currently being admitted under inpatient status for further management evaluation.      Assessment/Plan:      Moderate persistent asthma with acute exacerbation  Acute hypoxic and hypercapnic respiratory failure.  Leukocytosis  Essential hypertension.  Hyperlipidemia.  Type 2 diabetes mellitus.  CKD stage IIIa.  Glaucoma  Hyponatremia  Hypokalemia     Plan:    Continue scheduled DuoNeb.  As needed albuterol.  IV Solu-Medrol for 24 hours with plans to transition to p.o. prednisone from 25/ -25.  Hold off on antibiotics for

## 2025-07-05 NOTE — CARE COORDINATION
Case Management Assessment            Discharge Note                    Date / Time of Note: 7/5/2025 8:47 AM                  Discharge Note Completed by: ADELINA Zuleta    Patient Name: Carla Galan   YOB: 1945  Diagnosis: Hypoxia [R09.02]  Severe persistent asthma with exacerbation (HCC) [J45.51]  Asthma exacerbation attacks [J45.901]   Date / Time: 6/30/2025  5:52 PM    Current PCP: Marjorie Bond DO  Clinic patient: Yes    Hospitalization in the last 30 days: No       Advance Directives:  Code Status: Full Code  [place] DNR form completed and on chart: No    Financial:  Payor: MEDICARE / Plan: MEDICARE PART A AND B / Product Type: *No Product type* /      Pharmacy:    [facility] [address] [facility] [address] [facility] - P [phone] - F [phone]  [address] [facility] [address]  Phone: [phone] Fax: [phone]    [id number] [place] [address] -  [phone] - F [phone]  [address] [place] [address]  Phone: [phone] Fax: [phone]    [facility] [address] [place] [address] [phone] - F [phone]  [address] [facility] [address]  Phone: [phone] Fax: [phone]      Assistance purchasing medications?: Potential Assistance Purchasing Medications: No  Assistance provided by Case Management: None at this time    Does patient want to participate in local refill/ meds to beds program?: Yes    Meds To Beds General Rules:  1. Can ONLY be done Monday- Friday between 8:30am-5pm  2. Prescription(s) must be in pharmacy by 3pm to be filled same day  3.Copy of patient's insurance/ prescription drug card and patient face sheet must be sent along with the prescription(s)  4. Cost of Rx cannot be added to hospital bill. If financial assistance is needed, please contact unit  or ;  or  CANNOT provide  pharmacy voucher for patients co-pays  5. Patients can then  the prescription on their way out of the hospital at discharge, or pharmacy can deliver to the bedside if staff is available. (payment due at time of pick-up or delivery - cash, check, or card accepted)     Able to afford home medications/ co-pay costs: Yes    ADLS:  Current PT AM-PAC Score:   /24  Current OT AM-PAC Score: 19 /24    DISCHARGE Disposition: Home- No Services Needed    LOC at discharge: Not Applicable  BAIRON Completed: No    Notification completed in HENS/PAS?:  Not Applicable    IMM Completed:   Not Indicated due to: Last IMM 7/3    IMM Letter given to Patient/Family/Significant other/Guardian/POA/by:: LUCY Maxwell  IMM Letter date given:: 07/03/25  IMM Letter time given:: 1019    Transportation:  Transportation PLAN for discharge: family   Mode of Transport: Private Car  Reason for medical transport: Not Applicable  Name of Transport Company: Not Applicable  Time of Transport: NA    Transport form completed: NA    Durable Medical Equipment:  DME Provider: [facility]  Equipment obtained during hospitalization: walker and nebulizer    Referrals made at DISCHARGE for outpatient continued care:  Not Applicable    Additional CM Notes: JERI observed DC order and met with pt at bedside. Pt stated she will be DC-ing back home and will be transported by either her daughter or sister. DANIELAK noted pt is DC-ing with a RW and nebulizer via [facility].   No further CM needs.     JERI informed RN pt is ready for DC from CM standpoint.       COVID Result:    Lab Results   Component Value Date/Time    COVID19 NOT DETECTED 08/25/2020 04:09 PM       The Plan for Transition of Care is related to the following treatment goals of Hypoxia [R09.02]  Severe persistent asthma with exacerbation (HCC) [J45.51]  Asthma exacerbation attacks [J45.901]    The Patient and/or patient representative Carla and her family were provided with a choice of provider and

## 2025-07-05 NOTE — PLAN OF CARE
Problem: Chronic Conditions and Co-morbidities  Goal: Patient's chronic conditions and co-morbidity symptoms are monitored and maintained or improved  7/5/2025 0632 by Ivis Morris RN  Outcome: Progressing     Problem: Pain  Goal: Verbalizes/displays adequate comfort level or baseline comfort level  7/5/2025 0632 by Ivis Morris RN  Outcome: Progressing  Pt denies having any pain.     Problem: Safety - Adult  Goal: Free from fall injury  7/5/2025 0632 by Ivis Morris RN  Outcome: Progressing   Orthostatic vital signs obtained at start of shift - see flowsheet for details.  Pt does not meet criteria for orthostasis.  Pt is a High fall risk. See Rashid Fall Score and ABCDS Injury Risk assessments.   + Screening for Orthostasis and/or + High Fall Risk per RASHID/ABCDS: Explained fall risk precautions to pt and family and rationale behind their use to keep the patient safe. Pt bed is in low position, side rails up, call light and belongings are in reach. Fall wristband applied and present on pts wrist.  Bed alarm on.  Pt encouraged to call for assistance. Will continue with hourly rounds for PO intake, pain needs, toileting and repositioning as needed.     Problem: Respiratory - Adult  Goal: Achieves optimal ventilation and oxygenation  7/5/2025 0632 by Ivis Morris RN  Outcome: Progressing  Pt is room air with SpO2 >92%    Problem: ABCDS Injury Assessment  Goal: Absence of physical injury  7/5/2025 0632 by Ivis Morris RN  Outcome: Progressing     Problem: Metabolic/Fluid and Electrolytes - Adult  Goal: Electrolytes maintained within normal limits  Outcome: Progressing  Goal: Hemodynamic stability and optimal renal function maintained  Outcome: Progressing  Goal: Glucose maintained within prescribed range  Outcome: Progressing  Potassium is pending  Magnesium is pending    Problem: Hematologic - Adult  Goal: Maintains hematologic stability  Outcome: Progressing  Morning labs pending

## 2025-07-05 NOTE — RT PROTOCOL NOTE
RT Nebulizer Bronchodilator Protocol Note    There is a bronchodilator order in the chart from a provider indicating to follow the RT Bronchodilator Protocol and there is an “Initiate RT Bronchodilator Protocol” order as well (see protocol at bottom of note).    CXR Findings:  No results found.    The findings from the last RT Protocol Assessment were as follows:  Smoking: Chronic pulmonary disease  Respiratory Pattern: Regular pattern and RR 12-20 bpm  Breath Sounds: Slightly diminished and/or crackles  Cough: Strong, spontaneous, non-productive  Indication for Bronchodilator Therapy:    Bronchodilator Assessment Score: 4    Aerosolized bronchodilator medication orders have been revised according to the RT Nebulizer Bronchodilator Protocol below.    Respiratory Therapist to perform RT Therapy Protocol Assessment initially then follow the protocol.  Repeat RT Therapy Protocol Assessment PRN for score 0-3 or on second treatment, BID, and PRN for scores above 3.    No Indications - adjust the frequency to every 6 hours PRN wheezing or bronchospasm, if no treatments needed after 48 hours then discontinue using Per Protocol order mode.     If indication present, adjust the RT bronchodilator orders based on the Bronchodilator Assessment Score as indicated below.  If a patient is on this medication at home then do not decrease Frequency below that used at home.    0-3 - enter or revise RT bronchodilator order(s) to equivalent RT Bronchodilator order with Frequency of every 4 hours PRN for wheezing or increased work of breathing using Per Protocol order mode.       4-6 - enter or revise RT Bronchodilator order(s) to two equivalent RT bronchodilator orders with one order with BID Frequency and one order with Frequency of every 4 hours PRN wheezing or increased work of breathing using Per Protocol order mode.         7-10 - enter or revise RT Bronchodilator order(s) to two equivalent RT bronchodilator orders with one order  with TID Frequency and one order with Frequency of every 4 hours PRN wheezing or increased work of breathing using Per Protocol order mode.       11-13 - enter or revise RT Bronchodilator order(s) to one equivalent RT bronchodilator order with QID Frequency and an Albuterol order with Frequency of every 4 hours PRN wheezing or increased work of breathing using Per Protocol order mode.      Greater than 13 - enter or revise RT Bronchodilator order(s) to one equivalent RT bronchodilator order with every 4 hours Frequency and an Albuterol order with Frequency of every 2 hours PRN wheezing or increased work of breathing using Per Protocol order mode.     RT to enter RT Home Evaluation for COPD & MDI Assessment order using Per Protocol order mode.    Electronically signed by Jakob Lopez RCP on 7/5/2025 at 1:03 AM

## 2025-07-07 ENCOUNTER — CARE COORDINATION (OUTPATIENT)
Dept: CASE MANAGEMENT | Age: 80
End: 2025-07-07

## 2025-07-07 NOTE — CARE COORDINATION
"[facility]   Physical Therapy Treatment Note     Name: Ranjana Duquebert  Clinic Number: [id number]    Therapy Diagnosis:   Encounter Diagnosis   Name Primary?    Lumbar radiculopathy Yes           Physician: Tahira Mcmullen,*    Visit Date: 11/28/2022    Physician Orders: PT Eval and Treat   Medical Diagnosis from Referral: M54.16 (ICD-10-CM) - Lumbar radiculopathy   Evaluation Date: 10/25/2022  Authorization Period Expiration: 3/25/2023  Plan of Care Expiration: 1/25/2023  Progress Note Due: 11/25/2022  Visit # / Visits authorized: 5/20   FOTO: 1/3     Precautions: Standard      PTA Visit #: 3/5      Time In: 9:50 am  Time Out: 10:35 pm  Total Billable Time: 45 minutes     SUBJECTIVE      Pt reports: she tried to go for a walk yesterday and started to have pain after walking about a block  She was not provided with home exercise program at initial evaluation.  Response to previous treatment: mild soreness  Functional change: ongoing     Pain: 2/10  Location: bilateral low back and lower extremities       OBJECTIVE      Objective Measures updated at progress report unless specified.      Treatment      Ranjana received the treatments listed below:       therapeutic exercises to develop strength, endurance, ROM, flexibility, and core stabilization for 45 minutes including:  Recumbent bike 6'  PPT 20x5"  Supine pulldowns with blue thera-tubing with PPT 20x5"  Bridges with RTB around knees 2x10  LTR 10x5"  Braced marching x20  Side lying clamshells with RTB 20x2"  Paloff Press with RTB 10x3"   Sit to stand with hip hinging 2x10  Shuttle double leg press 50# 3x10    Not performed   Supine active hamstring stretch 3x30"  Piriformis stretch 3x30"  Supine Anti-Rotation Isometric 15x3"  TB Rows/Pull Downs with TrA 2x10 RTB                 Patient Education and Home Exercises      Home Exercises Provided and Patient Education Provided      Education provided:   - HEP  - Core " Care Transitions Note    Initial Call - Call within 2 business days of discharge: Yes    Attempted to reach patient for transitions of care follow up. Unable to reach patient.    Outreach Attempts:   HIPAA compliant voicemail left for patient.     Patient: Carla Galan    Patient : 1945   MRN: [id number]    Reason for Admission: asthma w/ exacerbation  Discharge Date: 25  RURS: Readmission Risk Score: 11.7    Last Discharge Facility       Date Complaint Diagnosis Description Type Department Provider    25 Shortness of Breath Severe persistent asthma with exacerbation (HCC) ... ED to Hosp-Admission (Discharged) (ADMITTED) [facility] Angelito Solitario MD; Willing, Law...            Was this an external facility discharge? No    Follow Up Appointment:   Patient does not have a follow up appointment scheduled at time of call. UTR  Future Appointments         Provider Specialty Dept Phone    2025 10:00 AM Marjorie Bond DO Primary Care [phone]            Plan for follow-up on next business day.      Trista Raya RN       activation with transitional movements     Written Home Exercises Provided: yes. Exercises were reviewed and Ranjana was able to demonstrate them prior to the end of the session.  Ranjana demonstrated good  understanding of the education provided. See EMR under Patient Instructions for exercises provided during therapy sessions     ASSESSMENT      Ranjana presents to treatment with continued improvement in symptoms. She did have one episode of increased nerve pain following walking around her home, but did not require medication to improve. Good tolerance progressions made today. Continue to progress patient as tolerated.     Ranjana Is progressing well towards her goals.   Pt prognosis is Good.      Pt will continue to benefit from skilled outpatient physical therapy to address the deficits listed in the problem list box on initial evaluation, provide pt/family education and to maximize pt's level of independence in the home and community environment.      Pt's spiritual, cultural and educational needs considered and pt agreeable to plan of care and goals.     Anticipated Barriers for therapy: pain limiting      Goals:  Short Term Goals: 4 weeks   Patient will show 25% improvement in lumbar range of motion   2. Patient will show 1/2 grade MMT improvement in hip strength  3. Patient will show increased step and stride length bilaterally throughout gait cycle  4. Patient will comply with home exercise program at all times     Long Term Goals: 8 weeks   Patient will be able to climb and descend 2 flights of stairs without pain increasing.   2. Patent will be able to stand and walk for > 60 minutes without pain increasing  3. Patient will be able to return to 60 minutes of Progressive strengthening exercise without pain increasing  4. 50% FOTO score improvement        PLAN     Continue to progress per patient tolerance     Devora Murphy, MAILE

## 2025-07-08 ENCOUNTER — CARE COORDINATION (OUTPATIENT)
Dept: CASE MANAGEMENT | Age: 80
End: 2025-07-08

## 2025-07-08 DIAGNOSIS — J45.30 MILD PERSISTENT ASTHMA WITHOUT COMPLICATION: Primary | ICD-10-CM

## 2025-07-08 PROCEDURE — 1111F DSCHRG MED/CURRENT MED MERGE: CPT | Performed by: FAMILY MEDICINE

## 2025-07-08 NOTE — CARE COORDINATION
Care Transitions Note    Initial Call - Call within 2 business days of discharge: Yes    Patient Current Location:  Home: [address] [place] [address] Rd  [address]  [facility] [address]    Care Transition Nurse contacted the patient by telephone to perform post hospital discharge assessment, verified name and  as identifiers.  Provided introduction to self, and explanation of the Care Transition Nurse role.    Patient: Carla Galan    Patient : 1945   MRN: [id number]    Reason for Admission: asthma exacerbation attacks  Discharge Date: 25  RURS: Readmission Risk Score: 11.7      Last Discharge Facility       Date Complaint Diagnosis Description Type Department Provider    25 Shortness of Breath Severe persistent asthma with exacerbation (HCC) ... ED to Hosp-Admission (Discharged) (ADMITTED) [facility] Angelito Solitario MD; Willing, Law...            Was this an external facility discharge? No    Additional needs identified to be addressed with provider   No needs identified         Method of communication with provider: none.    Patients top risk factors for readmission: functional physical ability    Interventions to address risk factors:   Education: asthma    Care Summary Note: Patient answered call and verified . Patient pleasant and agreeable to transition call. Patient with hoarse voice, but stated \"getting better\".    Patient feeling weak this morning, but feels weakness is still from hospitalization recovery. Patient confirmed she has AVS and medication review completed. Noted patient has lab order in system. Patient stated she has transportation that needs arranged. Reviewed available HFU slots with PCP with patient and stated she would discuss the dates with her daughter. Request a call back tomorrow and HFU would be scheduled at the follow up call. Denied any acute needs at present time.  Agreeable to f/u calls.  Educated on the use of urgent care or physician’s 24 hr access line if assistance is

## 2025-07-09 ENCOUNTER — CARE COORDINATION (OUTPATIENT)
Dept: CASE MANAGEMENT | Age: 80
End: 2025-07-09

## 2025-07-09 NOTE — CARE COORDINATION
Care Transitions Note    Follow Up Call     Patient Current Location:  Home: [address] [place] [address] Rd  [address]  [facility] [address]    Care Transition Nurse contacted the patient by telephone. Verified name and  as identifiers.    Additional needs identified to be addressed with provider   No needs identified         Method of communication with provider: none.    Care Summary Note: Patient answered call and verified . Patient pleasant and agreeable to transition call. Feeling \"better\" compared to yesterday. Patient no longer with hoarse voice. Stated breathing is easier and denied SOB . Taking treatments as directed. Scheduled HFU for later this week and daughter to take her to appt. Patient taking all medication as directed. Patient aware of lab order in system and will have drawn this week.   Denied any acute needs at present time.  Agreeable to f/u calls.  Educated on the use of urgent care or physician’s 24 hr access line if assistance is needed after hours.        Plan of care updates since last contact:  Education: HFU scheduled       Advance Care Planning:   Does patient have an Advance Directive: reviewed during previous call, see note. .    Medication Review:  Full medication review completed during previous call.    Remote Patient Monitoring:  Offered patient enrollment in the Remote Patient Monitoring (RPM) program for in-home monitoring: Deferred at this time because unable to discuss; will discuss at next outreach.    Assessments:  Care Transitions Subsequent and Final Call    Subsequent and Final Calls  Care Transitions Interventions  Other Interventions:              Follow Up Appointment:   Scheduled in system  Future Appointments         Provider Specialty Dept Phone    2025 10:00 AM Marjorie Bond DO Primary Care [phone]            Care Transition Nurse provided contact information.  Plan for follow-up call in 6-10 days based on severity of symptoms and risk factors.  Plan for

## 2025-07-11 ENCOUNTER — OFFICE VISIT (OUTPATIENT)
Dept: PRIMARY CARE CLINIC | Age: 80
End: 2025-07-11

## 2025-07-11 VITALS
WEIGHT: 129.2 LBS | SYSTOLIC BLOOD PRESSURE: 122 MMHG | HEIGHT: 65 IN | OXYGEN SATURATION: 99 % | HEART RATE: 62 BPM | BODY MASS INDEX: 21.52 KG/M2 | TEMPERATURE: 97.5 F | DIASTOLIC BLOOD PRESSURE: 68 MMHG

## 2025-07-11 DIAGNOSIS — N18.32 CHRONIC KIDNEY DISEASE, STAGE 3B (HCC): ICD-10-CM

## 2025-07-11 DIAGNOSIS — N18.30 TYPE 2 DIABETES MELLITUS WITH STAGE 3 CHRONIC KIDNEY DISEASE, WITHOUT LONG-TERM CURRENT USE OF INSULIN, UNSPECIFIED WHETHER STAGE 3A OR 3B CKD (HCC): ICD-10-CM

## 2025-07-11 DIAGNOSIS — E11.22 TYPE 2 DIABETES MELLITUS WITH STAGE 3 CHRONIC KIDNEY DISEASE, WITHOUT LONG-TERM CURRENT USE OF INSULIN, UNSPECIFIED WHETHER STAGE 3A OR 3B CKD (HCC): ICD-10-CM

## 2025-07-11 DIAGNOSIS — E26.9 HYPERALDOSTERONISM: ICD-10-CM

## 2025-07-11 DIAGNOSIS — E87.1 HYPONATREMIA: ICD-10-CM

## 2025-07-11 DIAGNOSIS — J45.40 MODERATE PERSISTENT ASTHMA WITHOUT COMPLICATION: Primary | ICD-10-CM

## 2025-07-11 DIAGNOSIS — Z09 HOSPITAL DISCHARGE FOLLOW-UP: ICD-10-CM

## 2025-07-11 PROBLEM — R73.9 HYPERGLYCEMIA: Status: RESOLVED | Noted: 2025-07-04 | Resolved: 2025-07-11

## 2025-07-11 LAB
ANION GAP SERPL CALCULATED.3IONS-SCNC: 12 MMOL/L (ref 3–16)
BUN SERPL-MCNC: 13 MG/DL (ref 7–20)
CALCIUM SERPL-MCNC: 9.5 MG/DL (ref 8.3–10.6)
CHLORIDE SERPL-SCNC: 97 MMOL/L (ref 99–110)
CO2 SERPL-SCNC: 24 MMOL/L (ref 21–32)
CREAT SERPL-MCNC: 1.4 MG/DL (ref 0.6–1.2)
GFR SERPLBLD CREATININE-BSD FMLA CKD-EPI: 39 ML/MIN/{1.73_M2}
GLUCOSE SERPL-MCNC: 196 MG/DL (ref 70–99)
POTASSIUM SERPL-SCNC: 4.9 MMOL/L (ref 3.5–5.1)
SODIUM SERPL-SCNC: 133 MMOL/L (ref 136–145)

## 2025-07-11 RX ORDER — BUDESONIDE 0.25 MG/2ML
250 INHALANT ORAL 2 TIMES DAILY
Qty: 60 EACH | Refills: 3 | Status: SHIPPED | OUTPATIENT
Start: 2025-07-11

## 2025-07-11 NOTE — PROGRESS NOTES
Post-Discharge Transitional Care  Follow Up      Carla Galan   YOB: 1945    Date of Office Visit:  7/11/2025  Date of Hospital Admission: 6/30/25  Date of Hospital Discharge: 7/5/25  Risk of hospital readmission (high >=14%. Medium >=10%) :Readmission Risk Score: 11.7      Care management risk score Rising risk (score 2-5) and Complex Care (Scores >=6): No Risk Score On File     Non face to face  following discharge, date last encounter closed (first attempt may have been earlier): 07/08/2025    Call initiated 2 business days of discharge: Yes    ASSESSMENT/PLAN:   Moderate persistent asthma without complication  -     budesonide (PULMICORT) 0.25 MG/2ML nebulizer suspension; Take 2 mLs by nebulization 2 times daily, Disp-60 each, R-3Normal  Type 2 diabetes mellitus with stage 3 chronic kidney disease, without long-term current use of insulin, unspecified whether stage 3a or 3b CKD (HCC)  Hospital discharge follow-up  -     ID DISCHARGE MEDS RECONCILED W/ CURRENT OUTPATIENT MED LIST  Hyponatremia  Hyperaldosteronism  Chronic kidney disease, stage 3b (HCC)    Long discussion with patient and daughter today. Daughter is hopeful to move patient back to [place] in the near future. She is agreeable to trying the nebulized pulmicort again. Will take this BID and reduce albuterol use to 1x midday PRN.   We have previously discussed updated PFTs. Will consider at a later date.     Repeat BMP - continue reduced dose of spironolactone.     Hyperglycemia - 2/2 steroid use. Continue diabetes mgmt.       Medical Decision Making: moderate complexity  Return if symptoms worsen or fail to improve.    On this date 7/11/2025 I have spent 35 minutes reviewing previous notes, test results and face to face with the patient discussing the diagnosis and importance of compliance with the treatment plan as well as documenting on the day of the visit.       Subjective:   HPI:  Follow up of Hospital problems/diagnosis(es):

## 2025-07-15 ENCOUNTER — TELEPHONE (OUTPATIENT)
Dept: PRIMARY CARE CLINIC | Age: 80
End: 2025-07-15

## 2025-07-15 NOTE — TELEPHONE ENCOUNTER
Please call patient and let her know that potassium is normal. I want her to continue on 1/2 tablet of the spironolactone. Please check to see how her breathing is. Recent asthma exacerbation.

## 2025-07-16 ENCOUNTER — CARE COORDINATION (OUTPATIENT)
Dept: CASE MANAGEMENT | Age: 80
End: 2025-07-16

## 2025-07-16 NOTE — CARE COORDINATION
Care Transitions Note    Follow Up Call     Patient Current Location:  Home: [address] [place] [address] Rd  [address]  [facility] [address]    Care Transition Nurse contacted the patient by telephone. Verified name and  as identifiers.    Additional needs identified to be addressed with provider   No needs identified           Method of communication with provider: none.    Care Summary Note: Patient answered call and verified . Patient pleasant and agreeable to transition call. Patient is feeling \"better\". Patient was seen by PCP for HFU and blood work was collected. Patient stated visit went well and medication changes discussed. Patient has BP monitor at her home and checks if needed. Discussed RPM but patient declined. Denied any acute needs at present time.  Agreeable to f/u calls.  Educated on the use of urgent care or physician’s 24 hr access line if assistance is needed after hours.      Plan of care updates since last contact:  Education: RPM discussed and declined       Advance Care Planning:   Does patient have an Advance Directive: reviewed during previous call, see note. .    Medication Review:  Full medication review completed during previous call.    Remote Patient Monitoring:  Offered patient enrollment in the Remote Patient Monitoring (RPM) program for in-home monitoring: Yes, but did not enroll at this time: already monitoring with home equipment and declined to enroll in the program becausedid not want to participate.    Assessments:  Care Transitions Subsequent and Final Call    Subsequent and Final Calls  Do you have any ongoing symptoms?: No  Have your medications changed?: No  Do you have any questions related to your medications?: No  Do you currently have any active services?: No  Do you have any needs or concerns that I can assist you with?: No  Identified Barriers: None  Care Transitions Interventions  Other Interventions:              Follow Up Appointment:   Reviewed upcoming appointment(s). and

## 2025-07-16 NOTE — TELEPHONE ENCOUNTER
Patient called and informed of results with recommendation. She states she is experiencing tightness and stuffiness after eating. She states once she does a treatment for asthma she feels fine.

## 2025-07-23 ENCOUNTER — CARE COORDINATION (OUTPATIENT)
Dept: CASE MANAGEMENT | Age: 80
End: 2025-07-23

## 2025-07-23 NOTE — CARE COORDINATION
Care Transitions Note    Follow Up Call     Patient Current Location:  Home: [address]  [address]  [facility] [address]    Care Transition Nurse contacted the patient by telephone. Verified name and  as identifiers.    Additional needs identified to be addressed with provider   No needs identified         Method of communication with provider: none.    Care Summary Note: incoming call from patient- verified  and agreeable to transition call. Patient is doing well and staying in the air conditioning d/t hot and humid weather. Breathing is doing well and continues to take treatments as directed. Has home vital sign equipment and checking as directed. Patient has been taking morning walks to assist with endurance and regaining strength. Walks are short and increasing in length a little each day. Diet is good and staying hydrated. Denied any acute needs at present time.  Agreeable to f/u calls.  Educated on the use of urgent care or physician’s 24 hr access line if assistance is needed after hours.    Plan of care updates since last contact:  Education: asthma w/o complication     Advance Care Planning:   Does patient have an Advance Directive: reviewed during previous call, see note. .    Medication Review:  Full medication review completed during previous call.    Remote Patient Monitoring:  Offered patient enrollment in the Remote Patient Monitoring (RPM) program for in-home monitoring: Yes, but did not enroll at this time: declined to enroll in the program becausepatient choice.    Assessments:  Care Transitions Subsequent and Final Call    Subsequent and Final Calls  Care Transitions Interventions  Other Interventions:              Follow Up Appointment:   RENARD appointment attended as scheduled   Future Appointments         Provider Specialty Dept Phone    2025 10:00 AM Marjorie Bond DO Primary Care [phone]            Care Transition Nurse provided contact information.  Plan for

## 2025-07-23 NOTE — CARE COORDINATION
Care Transitions Note    Follow Up Call     Attempted to reach patient for transitions of care follow up.  Unable to reach patient.      Outreach Attempts:   HIPAA compliant voicemail left for patient.     Care Summary Note: Attempted to reach patient via phone for transition call.  VM left stating purpose of call along with my contact information requesting a return call.        Follow Up Appointment:   Future Appointments         Provider Specialty Dept Phone    9/26/2025 10:00 AM Marjorie Bond DO Primary Care [phone]            Plan for follow-up call in 2-5 days based on severity of symptoms and risk factors. Plan for next call: self management-     Trista Raya RN

## 2025-07-25 DIAGNOSIS — N18.30 TYPE 2 DIABETES MELLITUS WITH STAGE 3 CHRONIC KIDNEY DISEASE, WITHOUT LONG-TERM CURRENT USE OF INSULIN, UNSPECIFIED WHETHER STAGE 3A OR 3B CKD (HCC): ICD-10-CM

## 2025-07-25 DIAGNOSIS — E11.22 TYPE 2 DIABETES MELLITUS WITH STAGE 3 CHRONIC KIDNEY DISEASE, WITHOUT LONG-TERM CURRENT USE OF INSULIN, UNSPECIFIED WHETHER STAGE 3A OR 3B CKD (HCC): ICD-10-CM

## 2025-07-25 RX ORDER — GLIPIZIDE 2.5 MG/1
2.5 TABLET, EXTENDED RELEASE ORAL DAILY
Qty: 90 TABLET | Refills: 1 | Status: SHIPPED | OUTPATIENT
Start: 2025-07-25

## 2025-07-25 NOTE — TELEPHONE ENCOUNTER
Medication:   Requested Prescriptions     Pending Prescriptions Disp Refills    glipiZIDE (GLUCOTROL XL) 2.5 MG extended release tablet [Pharmacy Med Name: glipiZIDE ER 2.5 MG Oral Tablet Extended Release 24 Hour] 90 tablet 0     Sig: Take 1 tablet by mouth once daily     Last Filled:  4/26/25    Last appt: 7/11/2025   Next appt: 9/26/2025

## 2025-07-30 ENCOUNTER — CARE COORDINATION (OUTPATIENT)
Dept: CARE COORDINATION | Age: 80
End: 2025-07-30

## 2025-07-30 NOTE — CARE COORDINATION
Care Transitions Note    Follow Up Call     Attempted to reach patient for transitions of care follow up.  Unable to reach patient.      Outreach Attempts:   Unable to leave message.     Care Summary Note: VM not set up    Follow Up Appointment:   Future Appointments         Provider Specialty Dept Phone    9/26/2025 10:00 AM Marjorie Bond DO Primary Care [phone]            Plan for follow-up call in 2-5 days based on severity of symptoms and risk factors. Plan for next call: self management-     Trista Raya RN

## 2025-07-31 ENCOUNTER — CARE COORDINATION (OUTPATIENT)
Dept: CARE COORDINATION | Age: 80
End: 2025-07-31

## 2025-07-31 NOTE — CARE COORDINATION
Care Transitions Note    Follow Up Call     Patient Current Location:  Home: [address] [place] [address] Rd  [address]  [facility] [address]    Care Transition Nurse contacted the patient by telephone. Verified name and  as identifiers.    Additional needs identified to be addressed with provider   No needs identified         Method of communication with provider: none.    Care Summary Note: Patient answered call and verified . Patient very pleasant and agreeable to transition call. Doing well and continues with breathing treatments as directed. Patient has noticed that after her treatments she will develop a hoarse voice. States the hoarseness lessens thru the day, but will re-occur after her treatments. Encouraged to try rinse mouth out with water after treatments. Stated understanding. Patient feels the treatments help with her breathing and asthma management. Denied any acute needs at present time.  Agreeable to f/u calls.  Educated on the use of urgent care or physician’s 24 hr access line if assistance is needed after hours.    Plan of care updates since last contact:  Education: HHN treatment   Hand off to [facility] Sweta pelayo after transition episode completed.    Advance Care Planning:   Does patient have an Advance Directive: reviewed during previous call, see note. .    Medication Review:  Full medication review completed during previous call.    Remote Patient Monitoring:  Offered patient enrollment in the Remote Patient Monitoring (RPM) program for in-home monitoring: Yes, but did not enroll at this time: declined to enroll in the program becausepatient choice.    Assessments:  Care Transitions Subsequent and Final Call    Subsequent and Final Calls  Care Transitions Interventions  Other Interventions:              Follow Up Appointment:   Reviewed upcoming appointment(s). and RENARD appointment attended as scheduled   Future Appointments         Provider Specialty Dept Phone    2025 10:00 AM Marjorie Bond

## 2025-08-04 DIAGNOSIS — E11.22 TYPE 2 DIABETES MELLITUS WITH STAGE 3 CHRONIC KIDNEY DISEASE, WITHOUT LONG-TERM CURRENT USE OF INSULIN, UNSPECIFIED WHETHER STAGE 3A OR 3B CKD (HCC): ICD-10-CM

## 2025-08-04 DIAGNOSIS — N18.30 TYPE 2 DIABETES MELLITUS WITH STAGE 3 CHRONIC KIDNEY DISEASE, WITHOUT LONG-TERM CURRENT USE OF INSULIN, UNSPECIFIED WHETHER STAGE 3A OR 3B CKD (HCC): ICD-10-CM

## 2025-08-04 DIAGNOSIS — E78.2 MIXED HYPERLIPIDEMIA: ICD-10-CM

## 2025-08-04 DIAGNOSIS — I10 ESSENTIAL HYPERTENSION: ICD-10-CM

## 2025-08-04 RX ORDER — PRAVASTATIN SODIUM 80 MG/1
80 TABLET ORAL NIGHTLY
Qty: 90 TABLET | Refills: 0 | Status: SHIPPED | OUTPATIENT
Start: 2025-08-04

## 2025-08-04 RX ORDER — GLIPIZIDE 2.5 MG/1
2.5 TABLET, EXTENDED RELEASE ORAL DAILY
Qty: 90 TABLET | Refills: 0 | Status: SHIPPED | OUTPATIENT
Start: 2025-08-04

## 2025-08-04 RX ORDER — METOPROLOL SUCCINATE 50 MG/1
50 TABLET, EXTENDED RELEASE ORAL DAILY
Qty: 90 TABLET | Refills: 0 | Status: SHIPPED | OUTPATIENT
Start: 2025-08-04

## 2025-08-06 ENCOUNTER — CARE COORDINATION (OUTPATIENT)
Dept: CARE COORDINATION | Age: 80
End: 2025-08-06

## 2025-08-08 ENCOUNTER — CARE COORDINATION (OUTPATIENT)
Dept: CARE COORDINATION | Age: 80
End: 2025-08-08

## 2025-08-13 DIAGNOSIS — J45.40 MODERATE PERSISTENT ASTHMA WITHOUT COMPLICATION: ICD-10-CM

## 2025-08-13 RX ORDER — ALBUTEROL SULFATE 0.83 MG/ML
SOLUTION RESPIRATORY (INHALATION)
Qty: 180 ML | Refills: 0 | Status: SHIPPED | OUTPATIENT
Start: 2025-08-13

## 2025-08-15 ENCOUNTER — CARE COORDINATION (OUTPATIENT)
Dept: CARE COORDINATION | Age: 80
End: 2025-08-15

## 2025-09-04 ENCOUNTER — CARE COORDINATION (OUTPATIENT)
Dept: CARE COORDINATION | Age: 80
End: 2025-09-04

## 2025-09-04 DIAGNOSIS — J45.40 MODERATE PERSISTENT ASTHMA WITHOUT COMPLICATION: ICD-10-CM

## 2025-09-05 RX ORDER — BUDESONIDE 0.25 MG/2ML
INHALANT ORAL
Qty: 60 ML | Refills: 2 | Status: SHIPPED | OUTPATIENT
Start: 2025-09-05